# Patient Record
Sex: MALE | Race: BLACK OR AFRICAN AMERICAN | NOT HISPANIC OR LATINO | URBAN - METROPOLITAN AREA
[De-identification: names, ages, dates, MRNs, and addresses within clinical notes are randomized per-mention and may not be internally consistent; named-entity substitution may affect disease eponyms.]

---

## 2020-10-28 ENCOUNTER — INPATIENT (INPATIENT)
Facility: HOSPITAL | Age: 65
LOS: 1 days | Discharge: HOME | DRG: 315 | End: 2020-10-30
Attending: INTERNAL MEDICINE | Admitting: INTERNAL MEDICINE
Payer: OTHER GOVERNMENT

## 2020-10-28 VITALS — HEIGHT: 72 IN

## 2020-10-28 DIAGNOSIS — I21.3 ST ELEVATION (STEMI) MYOCARDIAL INFARCTION OF UNSPECIFIED SITE: ICD-10-CM

## 2020-10-28 LAB
ALBUMIN SERPL ELPH-MCNC: 3.6 G/DL — SIGNIFICANT CHANGE UP (ref 3.3–5.2)
ALP SERPL-CCNC: 86 U/L — SIGNIFICANT CHANGE UP (ref 40–120)
ALT FLD-CCNC: 8 U/L — SIGNIFICANT CHANGE UP
ANION GAP SERPL CALC-SCNC: 12 MMOL/L — SIGNIFICANT CHANGE UP (ref 5–17)
AST SERPL-CCNC: 9 U/L — SIGNIFICANT CHANGE UP
BASOPHILS # BLD AUTO: 0.05 K/UL — SIGNIFICANT CHANGE UP (ref 0–0.2)
BASOPHILS NFR BLD AUTO: 0.5 % — SIGNIFICANT CHANGE UP (ref 0–2)
BILIRUB SERPL-MCNC: 0.3 MG/DL — LOW (ref 0.4–2)
BUN SERPL-MCNC: 16 MG/DL — SIGNIFICANT CHANGE UP (ref 8–20)
CALCIUM SERPL-MCNC: 8.4 MG/DL — LOW (ref 8.6–10.2)
CHLORIDE SERPL-SCNC: 106 MMOL/L — SIGNIFICANT CHANGE UP (ref 98–107)
CO2 SERPL-SCNC: 22 MMOL/L — SIGNIFICANT CHANGE UP (ref 22–29)
CREAT SERPL-MCNC: 1.09 MG/DL — SIGNIFICANT CHANGE UP (ref 0.5–1.3)
EOSINOPHIL # BLD AUTO: 0.08 K/UL — SIGNIFICANT CHANGE UP (ref 0–0.5)
EOSINOPHIL NFR BLD AUTO: 0.9 % — SIGNIFICANT CHANGE UP (ref 0–6)
GLUCOSE BLDC GLUCOMTR-MCNC: 243 MG/DL — HIGH (ref 70–99)
GLUCOSE BLDC GLUCOMTR-MCNC: 274 MG/DL — HIGH (ref 70–99)
GLUCOSE BLDC GLUCOMTR-MCNC: 368 MG/DL — HIGH (ref 70–99)
GLUCOSE SERPL-MCNC: 304 MG/DL — HIGH (ref 70–99)
HCT VFR BLD CALC: 35.2 % — LOW (ref 39–50)
HGB BLD-MCNC: 11.9 G/DL — LOW (ref 13–17)
IMM GRANULOCYTES NFR BLD AUTO: 0.3 % — SIGNIFICANT CHANGE UP (ref 0–1.5)
LYMPHOCYTES # BLD AUTO: 1.77 K/UL — SIGNIFICANT CHANGE UP (ref 1–3.3)
LYMPHOCYTES # BLD AUTO: 19.2 % — SIGNIFICANT CHANGE UP (ref 13–44)
MAGNESIUM SERPL-MCNC: 1.7 MG/DL — SIGNIFICANT CHANGE UP (ref 1.6–2.6)
MCHC RBC-ENTMCNC: 27.5 PG — SIGNIFICANT CHANGE UP (ref 27–34)
MCHC RBC-ENTMCNC: 33.8 GM/DL — SIGNIFICANT CHANGE UP (ref 32–36)
MCV RBC AUTO: 81.5 FL — SIGNIFICANT CHANGE UP (ref 80–100)
MONOCYTES # BLD AUTO: 0.67 K/UL — SIGNIFICANT CHANGE UP (ref 0–0.9)
MONOCYTES NFR BLD AUTO: 7.3 % — SIGNIFICANT CHANGE UP (ref 2–14)
NEUTROPHILS # BLD AUTO: 6.63 K/UL — SIGNIFICANT CHANGE UP (ref 1.8–7.4)
NEUTROPHILS NFR BLD AUTO: 71.8 % — SIGNIFICANT CHANGE UP (ref 43–77)
NT-PROBNP SERPL-SCNC: 620 PG/ML — HIGH (ref 0–300)
PLATELET # BLD AUTO: 241 K/UL — SIGNIFICANT CHANGE UP (ref 150–400)
POTASSIUM SERPL-MCNC: 3.3 MMOL/L — LOW (ref 3.5–5.3)
POTASSIUM SERPL-SCNC: 3.3 MMOL/L — LOW (ref 3.5–5.3)
PROT SERPL-MCNC: 6.3 G/DL — LOW (ref 6.6–8.7)
RBC # BLD: 4.32 M/UL — SIGNIFICANT CHANGE UP (ref 4.2–5.8)
RBC # FLD: 13.6 % — SIGNIFICANT CHANGE UP (ref 10.3–14.5)
SARS-COV-2 RNA SPEC QL NAA+PROBE: SIGNIFICANT CHANGE UP
SODIUM SERPL-SCNC: 140 MMOL/L — SIGNIFICANT CHANGE UP (ref 135–145)
TROPONIN T SERPL-MCNC: <0.01 NG/ML — SIGNIFICANT CHANGE UP (ref 0–0.06)
TROPONIN T SERPL-MCNC: <0.01 NG/ML — SIGNIFICANT CHANGE UP (ref 0–0.06)
WBC # BLD: 9.23 K/UL — SIGNIFICANT CHANGE UP (ref 3.8–10.5)
WBC # FLD AUTO: 9.23 K/UL — SIGNIFICANT CHANGE UP (ref 3.8–10.5)

## 2020-10-28 PROCEDURE — 71045 X-RAY EXAM CHEST 1 VIEW: CPT | Mod: 26

## 2020-10-28 PROCEDURE — 99223 1ST HOSP IP/OBS HIGH 75: CPT | Mod: AI

## 2020-10-28 PROCEDURE — 99222 1ST HOSP IP/OBS MODERATE 55: CPT

## 2020-10-28 PROCEDURE — 99291 CRITICAL CARE FIRST HOUR: CPT

## 2020-10-28 PROCEDURE — 93010 ELECTROCARDIOGRAM REPORT: CPT

## 2020-10-28 RX ORDER — DEXTROSE 50 % IN WATER 50 %
25 SYRINGE (ML) INTRAVENOUS ONCE
Refills: 0 | Status: DISCONTINUED | OUTPATIENT
Start: 2020-10-28 | End: 2020-10-30

## 2020-10-28 RX ORDER — INSULIN LISPRO 100/ML
VIAL (ML) SUBCUTANEOUS
Refills: 0 | Status: DISCONTINUED | OUTPATIENT
Start: 2020-10-28 | End: 2020-10-30

## 2020-10-28 RX ORDER — HEPARIN SODIUM 5000 [USP'U]/ML
5000 INJECTION INTRAVENOUS; SUBCUTANEOUS EVERY 12 HOURS
Refills: 0 | Status: DISCONTINUED | OUTPATIENT
Start: 2020-10-28 | End: 2020-10-30

## 2020-10-28 RX ORDER — POTASSIUM CHLORIDE 20 MEQ
40 PACKET (EA) ORAL ONCE
Refills: 0 | Status: COMPLETED | OUTPATIENT
Start: 2020-10-28 | End: 2020-10-28

## 2020-10-28 RX ORDER — GLUCAGON INJECTION, SOLUTION 0.5 MG/.1ML
1 INJECTION, SOLUTION SUBCUTANEOUS ONCE
Refills: 0 | Status: DISCONTINUED | OUTPATIENT
Start: 2020-10-28 | End: 2020-10-30

## 2020-10-28 RX ORDER — MORPHINE SULFATE 50 MG/1
4 CAPSULE, EXTENDED RELEASE ORAL ONCE
Refills: 0 | Status: DISCONTINUED | OUTPATIENT
Start: 2020-10-28 | End: 2020-10-28

## 2020-10-28 RX ORDER — SODIUM CHLORIDE 9 MG/ML
1000 INJECTION, SOLUTION INTRAVENOUS
Refills: 0 | Status: DISCONTINUED | OUTPATIENT
Start: 2020-10-28 | End: 2020-10-30

## 2020-10-28 RX ORDER — ASPIRIN/CALCIUM CARB/MAGNESIUM 324 MG
324 TABLET ORAL ONCE
Refills: 0 | Status: COMPLETED | OUTPATIENT
Start: 2020-10-28 | End: 2020-10-28

## 2020-10-28 RX ORDER — ASPIRIN/CALCIUM CARB/MAGNESIUM 324 MG
81 TABLET ORAL DAILY
Refills: 0 | Status: DISCONTINUED | OUTPATIENT
Start: 2020-10-29 | End: 2020-10-30

## 2020-10-28 RX ORDER — DEXTROSE 50 % IN WATER 50 %
12.5 SYRINGE (ML) INTRAVENOUS ONCE
Refills: 0 | Status: DISCONTINUED | OUTPATIENT
Start: 2020-10-28 | End: 2020-10-30

## 2020-10-28 RX ORDER — DEXTROSE 50 % IN WATER 50 %
15 SYRINGE (ML) INTRAVENOUS ONCE
Refills: 0 | Status: DISCONTINUED | OUTPATIENT
Start: 2020-10-28 | End: 2020-10-30

## 2020-10-28 RX ADMIN — MORPHINE SULFATE 4 MILLIGRAM(S): 50 CAPSULE, EXTENDED RELEASE ORAL at 11:33

## 2020-10-28 RX ADMIN — MORPHINE SULFATE 4 MILLIGRAM(S): 50 CAPSULE, EXTENDED RELEASE ORAL at 12:22

## 2020-10-28 RX ADMIN — HEPARIN SODIUM 5000 UNIT(S): 5000 INJECTION INTRAVENOUS; SUBCUTANEOUS at 16:51

## 2020-10-28 RX ADMIN — Medication 324 MILLIGRAM(S): at 11:33

## 2020-10-28 RX ADMIN — Medication 40 MILLIEQUIVALENT(S): at 13:22

## 2020-10-28 RX ADMIN — Medication 10: at 16:51

## 2020-10-28 NOTE — ED PROVIDER NOTE - PMH
Anxiety Attack    Arrhythmia  s/p ?ablation 4 years ago @ [facility]  BPH (Benign Prostatic Hypertrophy)  took pills for 1 month then stopped 3 months ago b/c "they weren't helping"  Diabetes    DJD (Degenerative Joint Disease)  of BL Knees & R shoulder  Herniated Disc  C4-6, & 2 bulging discs in lower back  HTN (Hypertension)    Prostate CA

## 2020-10-28 NOTE — CONSULT NOTE ADULT - ASSESSMENT
64 M, former smoker, with a significant PMH of "cardiac fat removal years ago", obesity and glaucoma who presents to the ER following a near syncopal, hypotensive episode at his out patient opthalmology office and was BIBEMS for hypotension.  Code STEMI called.  Upon evaluation, patient without chest pain or shortness of breath. Repeat EKG with LVH.    Near syncope  - Code STEMI cancelled  - Patient asymptomatic  - Pending CBC, CMP, Mg, Phos, Trop, BNP and COVID PCR  - Patient to receive  mg  - Serial troponins  - Repeat EKG in setting of chest pain and call cardiology  - Pending TTE for evaluation of wall motion abnormalities  - [place] cardiology following.   64 M, former smoker, with a significant PMH of "cardiac fat removal years ago", obesity and glaucoma who presents to the ER following a near syncopal, hypotensive episode at his out patient opthalmology office and was BIBEMS for hypotension.  Code STEMI called.  Upon evaluation, patient without chest pain or shortness of breath. Repeat EKG with LVH.    Near syncope  - Code STEMI cancelled; EKG shows LVH with repolarization abnormality  - Patient asymptomatic  - labs reviewed, first troponin is negative  - received  mg  - Serial troponin  - Pending TTE for evaluation of wall motion abnormalities, LV structure, and function  - check orthostatic vitals; hydrate with NS IVF 100cc/hr x1L  - tele x 24 hours  - trial of meclizine for vertigo  - follow up with ophthamology for eye pain

## 2020-10-28 NOTE — ED ADULT NURSE NOTE - PMH
Anxiety Attack    Arrhythmia  s/p ?ablation 4 years ago @ [facility]  Borderline Diabetes Mellitus    BPH (Benign Prostatic Hypertrophy)  took pills for 1 month then stopped 3 months ago b/c "they weren't helping"  DJD (Degenerative Joint Disease)  of BL Knees & R shoulder  Herniated Disc  C4-6, & 2 bulging discs in lower back  HTN (Hypertension)     Anxiety Attack    Arrhythmia  s/p ?ablation 4 years ago @ [facility]  BPH (Benign Prostatic Hypertrophy)  took pills for 1 month then stopped 3 months ago b/c "they weren't helping"  Diabetes    DJD (Degenerative Joint Disease)  of BL Knees & R shoulder  Herniated Disc  C4-6, & 2 bulging discs in lower back  HTN (Hypertension)    Prostate CA

## 2020-10-28 NOTE — ED PROVIDER NOTE - CLINICAL SUMMARY MEDICAL DECISION MAKING FREE TEXT BOX
CODE STEMI from EKG life alert, BP/hr improved with fluids, repeat EKG still with abnormalities, Dr. Prieto at [facility], likely LVH unless change in ekg/trop or CP/SOB no cath now. serial trops, TTE. possible vasovagal related to eye manipulation, chornic eye pain and HA.

## 2020-10-28 NOTE — H&P ADULT - NSHPLABSRESULTS_GEN_ALL_CORE
11.9   9.23  )-----------( 241      ( 28 Oct 2020 11:28 )             35.2       10-28    140  |  106  |  16.0  ----------------------------<  304<H>  3.3<L>   |  22.0  |  1.09    Ca    8.4<L>      28 Oct 2020 11:28  Mg     1.7     10-28    TPro  6.3<L>  /  Alb  3.6  /  TBili  0.3<L>  /  DBili  x   /  AST  9   /  ALT  8   /  AlkPhos  86  10-28      < from: Xray Chest 1 View- PORTABLE-Urgent (10.28.20 @ 11:41) >    INTERPRETATION:  AP chest on October 28, 2020 at 11:33 AM. Patient has chest pain.    Extreme left lateral chest is cut off the study.    COMPARISON: None available.    Heart is magnified by technique.    No lung consolidations or layering effusions are evident.    IMPRESSION: No acute finding.        MEME TAYLOR MD; Attending Radiologist  This document has been electronically signed. Oct 28 2020  2:23PM    < end of copied text >

## 2020-10-28 NOTE — ED ADULT TRIAGE NOTE - CHIEF COMPLAINT QUOTE
pt BIBA from home for chest pain. as per ems, pt hypotensive and bradycardic en route. CODE STEMI activated upon arrival. pt sent to [facility].

## 2020-10-28 NOTE — H&P ADULT - NSICDXPASTMEDICALHX_GEN_ALL_CORE_FT
PAST MEDICAL HISTORY:  Anxiety Attack     Arrhythmia s/p ?ablation 4 years ago @ [facility]    BPH (Benign Prostatic Hypertrophy) took pills for 1 month then stopped 3 months ago b/c "they weren't helping"    Diabetes     DJD (Degenerative Joint Disease) of BL Knees & R shoulder    Herniated Disc C4-6, & 2 bulging discs in lower back    HTN (Hypertension)     Prostate CA

## 2020-10-28 NOTE — ED PROVIDER NOTE - PHYSICAL EXAMINATION
Gen: uncomfortable, laying in stretcher with eyes closed, AOx3  Head: NCAT  HEENT: unable to exam Lt eye due to pain and inability to fully open eylid, rt eye wnl, oral mucosa moist, normal conjunctiva, neck supple  Lung: CTAB, no respiratory distress  CV: bradycardic, no murmur, Normal perfusion  Abd: soft, NTND, obese  MSK: trace b/l LE edema, no visible deformities  Neuro: No focal neurologic deficits  Skin: No rash   Psych: normal affect

## 2020-10-28 NOTE — ED ADULT NURSE NOTE - OBJECTIVE STATEMENT
Pt BIBA comes from opthomologist office s/p receiving injection dye for upcoming glaucoma surgery, as per EMS, pt had syncopal episode, became hypotensive w/ abnormal EKG, c/o chest pain which has subsided since ED arrival, hx of heart disease, HTN, HLD, DM, and had fatty tissue removed from heart ~ 20 years ago, AOx3, resp even and unlabored, IVF infusing by EMS, pt received no medications PTA, code STEMI activated, quit smoking ~ 20 years ago

## 2020-10-28 NOTE — ED ADULT NURSE REASSESSMENT NOTE - NS ED NURSE REASSESS COMMENT FT1
Assumed patient care at 1210, report received from previous RN, charting as noted. Patient A&Ox4 complaining of pain to left eye, has patch in place. Denies any chest pain, shortness of breath, nausea or dizziness. Respirations even & unlabored. Placed on cardiac monitor. Saline lock in place, patent, negative s/s phlebitis or infiltration. Provided with urinal as requested. Plan of care discussed, all questions answered. Will monitor.

## 2020-10-28 NOTE — H&P ADULT - NSHPPHYSICALEXAM_GEN_ALL_CORE
PHYSICAL EXAM:  Vital Signs Last 24 Hrs  T(C): 36.6 (28 Oct 2020 11:36), Max: 36.6 (28 Oct 2020 11:36)  T(F): 97.8 (28 Oct 2020 11:36), Max: 97.8 (28 Oct 2020 11:36)  HR: 68 (28 Oct 2020 11:58) (68 - 71)  BP: 125/65 (28 Oct 2020 11:58) (120/59 - 174/72)  BP(mean): --  RR: 20 (28 Oct 2020 11:58) (18 - 20)  SpO2: 94% (28 Oct 2020 11:58) (94% - 94%)    GENERAL: NAD, well-groomed, well-developed  HEAD:  Atraumatic, Normocephalic  EYES: Righ EOM & PERRLA, conjunctiva and sclera clear, Left eye covered with gauze, right eye no abnormalities.  ENMT: No tonsillar erythema, exudates, or enlargement; Moist mucous membranes  NERVOUS SYSTEM:  Alert & Oriented X3, Good concentration; Motor Strength 5/5 B/L upper and lower extremities  CHEST/LUNG: Clear to auscultation bilaterally; No rales, rhonchi, wheezing  HEART: Regular rate and rhythm; +2 holosystolic murmur  ABDOMEN: Soft, Nontender, Nondistended; Bowel sounds present  EXTREMITIES:  2+ Peripheral Pulses, No clubbing, cyanosis, or edema

## 2020-10-28 NOTE — H&P ADULT - HISTORY OF PRESENT ILLNESS
64 year old male with pmhx of HTN, DM, COPD, glaucoma and prostate ca s/p cyber knife procedure presents to the ED after a near syncopal episode at his ophthalmologist office. Patient stated that he was sitting down after a procedure on his eye. He said he felt like he "blacked out" but was able to hear everything going on. He was found to be hypotensive. He denies any prodromal symptoms including flashing lights, ringing in the hear, seizure activity, dizziness, chest pain or worsening SOB. He does admit that he was in pain in his eye right before the episode. EKG taken in the ambulance showed St elevation. Code STEMI was called, repeat EKG showed LVH with repolarization per cardio eval. Patient has been asymptomatic, first trop was negative and code STEMI was canceled. He received morphine and ASA 325mg. He is currently comfortable, denies any pain, CP, SOB or dizziness.

## 2020-10-28 NOTE — ED PROVIDER NOTE - OBJECTIVE STATEMENT
63yo M with glaucoma and cataracts- chronic lt eye pain and unable to see for 5 weeks, was at optho today for retinal exam, had pressure checked and flourescin stain had synopal epsidoes, EMS arrived BP in 60systolic, HR 50s, given fluids improved. no CP/SOB. main complaint now of HA lt sided chronic related to eye had before episode. no focal weakness. +dizziness now improved since initial EMS arrval, has h/o DM, HTN and 'fat around my heart' they 'went in my groin and sucked it out' no stents. has a 'thick heart' and abnormal ekg.

## 2020-10-28 NOTE — ED PROVIDER NOTE - CARE PLAN
Principal Discharge DX:	Syncope and collapse  Secondary Diagnosis:	Glaucoma  Secondary Diagnosis:	Abnormal EKG

## 2020-10-28 NOTE — H&P ADULT - ASSESSMENT
64 year old male with pmhx of HTN, DM, COPD, glaucoma and prostate ca s/p cyber knife procedure presents to the ED after a near syncopal episode at his ophthalmologist office. Patient stated that he was sitting down after a procedure on his eye. He said he felt like he "blacked out" but was able to hear everything going on. He was found to be hypotensive. He denies any prodromal symptoms including flashing lights, ringing in the hear, seizure activity, dizziness, chest pain or worsening SOB. He does admit that he was in pain in his eye right before the episode. EKG taken in the ambulance showed St elevation. Code STEMI was called, repeat EKG showed LVH with repolarization per cardio eval. Patient has been asymptomatic, first trop was negative and code STEMI was canceled. He received morphine and ASA 325mg. He is currently comfortable, denies any pain, CP, SOB or dizziness.    1) Near syncope- likely vasovagal, EKG abnormalities.   Admit to tele  Cardiology consult: EKG shows LVH with repolarization, unlikely ACS. Observe on telemetry overnight, echocardiogram tomorrow.   Continue to trend troponin.   Continue ASA 81mg daily  Unsure if patient is on statin, obtain lipid panel   Obtain orthostatic BP   Continue meclozine for symptomatic management    2) Hypokalemia  K 3.3 on admission   40mEq given in the ED  Will continue to monitor     3) HTN, DM  Home medications unknown by patient, [facility] since [facility] and no one picks up.   Start ISS while in the hospital   BP stable at this time at 125/65, consider lisinopril if elevated.     4) COPD   94% on RA at this time, not in respiratory distress, lung clear to auscultation  monitor    5) Prophylactic measure  - DVT ppx: heparin SQ      Home medications unknown by patient, unable [facility] since I called and no one picks up.

## 2020-10-29 LAB
A1C WITH ESTIMATED AVERAGE GLUCOSE RESULT: 11.5 % — HIGH (ref 4–5.6)
ANION GAP SERPL CALC-SCNC: 13 MMOL/L — SIGNIFICANT CHANGE UP (ref 5–17)
BUN SERPL-MCNC: 15 MG/DL — SIGNIFICANT CHANGE UP (ref 8–20)
CALCIUM SERPL-MCNC: 8.9 MG/DL — SIGNIFICANT CHANGE UP (ref 8.6–10.2)
CHLORIDE SERPL-SCNC: 106 MMOL/L — SIGNIFICANT CHANGE UP (ref 98–107)
CHOLEST SERPL-MCNC: 181 MG/DL — SIGNIFICANT CHANGE UP
CO2 SERPL-SCNC: 24 MMOL/L — SIGNIFICANT CHANGE UP (ref 22–29)
CREAT SERPL-MCNC: 0.95 MG/DL — SIGNIFICANT CHANGE UP (ref 0.5–1.3)
ESTIMATED AVERAGE GLUCOSE: 283 MG/DL — HIGH (ref 68–114)
GLUCOSE BLDC GLUCOMTR-MCNC: 160 MG/DL — HIGH (ref 70–99)
GLUCOSE BLDC GLUCOMTR-MCNC: 166 MG/DL — HIGH (ref 70–99)
GLUCOSE BLDC GLUCOMTR-MCNC: 177 MG/DL — HIGH (ref 70–99)
GLUCOSE BLDC GLUCOMTR-MCNC: 203 MG/DL — HIGH (ref 70–99)
GLUCOSE BLDC GLUCOMTR-MCNC: 221 MG/DL — HIGH (ref 70–99)
GLUCOSE SERPL-MCNC: 167 MG/DL — HIGH (ref 70–99)
HCT VFR BLD CALC: 39 % — SIGNIFICANT CHANGE UP (ref 39–50)
HCV AB S/CO SERPL IA: 0.09 S/CO — SIGNIFICANT CHANGE UP (ref 0–0.99)
HCV AB SERPL-IMP: SIGNIFICANT CHANGE UP
HDLC SERPL-MCNC: 38 MG/DL — LOW
HGB BLD-MCNC: 13 G/DL — SIGNIFICANT CHANGE UP (ref 13–17)
LIPID PNL WITH DIRECT LDL SERPL: 116 MG/DL — HIGH
MAGNESIUM SERPL-MCNC: 1.7 MG/DL — SIGNIFICANT CHANGE UP (ref 1.6–2.6)
MCHC RBC-ENTMCNC: 27.7 PG — SIGNIFICANT CHANGE UP (ref 27–34)
MCHC RBC-ENTMCNC: 33.3 GM/DL — SIGNIFICANT CHANGE UP (ref 32–36)
MCV RBC AUTO: 83 FL — SIGNIFICANT CHANGE UP (ref 80–100)
NON HDL CHOLESTEROL: 143 MG/DL — HIGH
PLATELET # BLD AUTO: 263 K/UL — SIGNIFICANT CHANGE UP (ref 150–400)
POTASSIUM SERPL-MCNC: 3.8 MMOL/L — SIGNIFICANT CHANGE UP (ref 3.5–5.3)
POTASSIUM SERPL-SCNC: 3.8 MMOL/L — SIGNIFICANT CHANGE UP (ref 3.5–5.3)
RBC # BLD: 4.7 M/UL — SIGNIFICANT CHANGE UP (ref 4.2–5.8)
RBC # FLD: 13.6 % — SIGNIFICANT CHANGE UP (ref 10.3–14.5)
SARS-COV-2 IGG SERPL QL IA: NEGATIVE — SIGNIFICANT CHANGE UP
SARS-COV-2 IGM SERPL IA-ACNC: 0.02 INDEX — SIGNIFICANT CHANGE UP
SODIUM SERPL-SCNC: 143 MMOL/L — SIGNIFICANT CHANGE UP (ref 135–145)
TRIGL SERPL-MCNC: 134 MG/DL — SIGNIFICANT CHANGE UP
WBC # BLD: 9.27 K/UL — SIGNIFICANT CHANGE UP (ref 3.8–10.5)
WBC # FLD AUTO: 9.27 K/UL — SIGNIFICANT CHANGE UP (ref 3.8–10.5)

## 2020-10-29 PROCEDURE — 99223 1ST HOSP IP/OBS HIGH 75: CPT | Mod: 57

## 2020-10-29 PROCEDURE — 93306 TTE W/DOPPLER COMPLETE: CPT | Mod: 26

## 2020-10-29 PROCEDURE — 99232 SBSQ HOSP IP/OBS MODERATE 35: CPT

## 2020-10-29 RX ORDER — CYCLOPENTOLATE HYDROCHLORIDE 10 MG/ML
1 SOLUTION/ DROPS OPHTHALMIC
Refills: 0 | Status: DISCONTINUED | OUTPATIENT
Start: 2020-10-29 | End: 2020-10-30

## 2020-10-29 RX ORDER — FLUTICASONE PROPIONATE 50 MCG
1 SPRAY, SUSPENSION NASAL
Refills: 0 | Status: DISCONTINUED | OUTPATIENT
Start: 2020-10-29 | End: 2020-10-30

## 2020-10-29 RX ORDER — METFORMIN HYDROCHLORIDE 850 MG/1
1 TABLET ORAL
Qty: 0 | Refills: 0 | DISCHARGE

## 2020-10-29 RX ORDER — TAMSULOSIN HYDROCHLORIDE 0.4 MG/1
0.4 CAPSULE ORAL AT BEDTIME
Refills: 0 | Status: DISCONTINUED | OUTPATIENT
Start: 2020-10-29 | End: 2020-10-30

## 2020-10-29 RX ORDER — INFLUENZA VIRUS VACCINE 15; 15; 15; 15 UG/.5ML; UG/.5ML; UG/.5ML; UG/.5ML
0.5 SUSPENSION INTRAMUSCULAR ONCE
Refills: 0 | Status: DISCONTINUED | OUTPATIENT
Start: 2020-10-29 | End: 2020-10-30

## 2020-10-29 RX ORDER — CARVEDILOL PHOSPHATE 80 MG/1
25 CAPSULE, EXTENDED RELEASE ORAL ONCE
Refills: 0 | Status: COMPLETED | OUTPATIENT
Start: 2020-10-29 | End: 2020-10-29

## 2020-10-29 RX ORDER — ALFUZOSIN HYDROCHLORIDE 10 MG/1
1 TABLET, EXTENDED RELEASE ORAL
Qty: 0 | Refills: 0 | DISCHARGE

## 2020-10-29 RX ORDER — ATORVASTATIN CALCIUM 80 MG/1
40 TABLET, FILM COATED ORAL AT BEDTIME
Refills: 0 | Status: DISCONTINUED | OUTPATIENT
Start: 2020-10-29 | End: 2020-10-30

## 2020-10-29 RX ORDER — LABETALOL HCL 100 MG
400 TABLET ORAL ONCE
Refills: 0 | Status: COMPLETED | OUTPATIENT
Start: 2020-10-29 | End: 2020-10-29

## 2020-10-29 RX ORDER — INSULIN GLARGINE 100 [IU]/ML
30 INJECTION, SOLUTION SUBCUTANEOUS AT BEDTIME
Refills: 0 | Status: DISCONTINUED | OUTPATIENT
Start: 2020-10-29 | End: 2020-10-30

## 2020-10-29 RX ORDER — LISINOPRIL 2.5 MG/1
40 TABLET ORAL DAILY
Refills: 0 | Status: DISCONTINUED | OUTPATIENT
Start: 2020-10-29 | End: 2020-10-30

## 2020-10-29 RX ORDER — HYDRALAZINE HCL 50 MG
10 TABLET ORAL ONCE
Refills: 0 | Status: COMPLETED | OUTPATIENT
Start: 2020-10-29 | End: 2020-10-29

## 2020-10-29 RX ORDER — CARVEDILOL PHOSPHATE 80 MG/1
25 CAPSULE, EXTENDED RELEASE ORAL EVERY 12 HOURS
Refills: 0 | Status: DISCONTINUED | OUTPATIENT
Start: 2020-10-29 | End: 2020-10-29

## 2020-10-29 RX ORDER — HYDRALAZINE HCL 50 MG
5 TABLET ORAL ONCE
Refills: 0 | Status: COMPLETED | OUTPATIENT
Start: 2020-10-29 | End: 2020-10-29

## 2020-10-29 RX ORDER — ACETAMINOPHEN 500 MG
650 TABLET ORAL ONCE
Refills: 0 | Status: COMPLETED | OUTPATIENT
Start: 2020-10-29 | End: 2020-10-29

## 2020-10-29 RX ORDER — LABETALOL HCL 100 MG
10 TABLET ORAL ONCE
Refills: 0 | Status: COMPLETED | OUTPATIENT
Start: 2020-10-29 | End: 2020-10-29

## 2020-10-29 RX ORDER — LISINOPRIL 2.5 MG/1
40 TABLET ORAL DAILY
Refills: 0 | Status: DISCONTINUED | OUTPATIENT
Start: 2020-10-29 | End: 2020-10-29

## 2020-10-29 RX ORDER — ACETAMINOPHEN 500 MG
650 TABLET ORAL EVERY 6 HOURS
Refills: 0 | Status: DISCONTINUED | OUTPATIENT
Start: 2020-10-29 | End: 2020-10-30

## 2020-10-29 RX ORDER — LISINOPRIL 2.5 MG/1
40 TABLET ORAL
Refills: 0 | Status: DISCONTINUED | OUTPATIENT
Start: 2020-10-29 | End: 2020-10-29

## 2020-10-29 RX ORDER — ACETAZOLAMIDE 250 MG/1
250 TABLET ORAL DAILY
Refills: 0 | Status: DISCONTINUED | OUTPATIENT
Start: 2020-10-29 | End: 2020-10-29

## 2020-10-29 RX ORDER — FUROSEMIDE 40 MG
40 TABLET ORAL DAILY
Refills: 0 | Status: DISCONTINUED | OUTPATIENT
Start: 2020-10-29 | End: 2020-10-29

## 2020-10-29 RX ORDER — FUROSEMIDE 40 MG
40 TABLET ORAL
Refills: 0 | Status: DISCONTINUED | OUTPATIENT
Start: 2020-10-29 | End: 2020-10-30

## 2020-10-29 RX ORDER — TAMSULOSIN HYDROCHLORIDE 0.4 MG/1
0.8 CAPSULE ORAL AT BEDTIME
Refills: 0 | Status: DISCONTINUED | OUTPATIENT
Start: 2020-10-29 | End: 2020-10-29

## 2020-10-29 RX ORDER — CLOTRIMAZOLE 10 MG
2 TROCHE MUCOUS MEMBRANE
Qty: 0 | Refills: 0 | DISCHARGE

## 2020-10-29 RX ORDER — ASCORBIC ACID 60 MG
500 TABLET,CHEWABLE ORAL DAILY
Refills: 0 | Status: DISCONTINUED | OUTPATIENT
Start: 2020-10-29 | End: 2020-10-30

## 2020-10-29 RX ORDER — INSULIN LISPRO 100/ML
3 VIAL (ML) SUBCUTANEOUS
Refills: 0 | Status: DISCONTINUED | OUTPATIENT
Start: 2020-10-29 | End: 2020-10-30

## 2020-10-29 RX ORDER — ACETAZOLAMIDE 250 MG/1
250 TABLET ORAL
Refills: 0 | Status: DISCONTINUED | OUTPATIENT
Start: 2020-10-29 | End: 2020-10-29

## 2020-10-29 RX ORDER — AMLODIPINE BESYLATE 2.5 MG/1
10 TABLET ORAL DAILY
Refills: 0 | Status: DISCONTINUED | OUTPATIENT
Start: 2020-10-29 | End: 2020-10-30

## 2020-10-29 RX ORDER — CYCLOPENTOLATE HYDROCHLORIDE 10 MG/ML
1 SOLUTION/ DROPS OPHTHALMIC
Qty: 0 | Refills: 0 | DISCHARGE

## 2020-10-29 RX ORDER — LATANOPROST 0.05 MG/ML
1 SOLUTION/ DROPS OPHTHALMIC; TOPICAL AT BEDTIME
Refills: 0 | Status: DISCONTINUED | OUTPATIENT
Start: 2020-10-29 | End: 2020-10-30

## 2020-10-29 RX ORDER — ACETAZOLAMIDE 250 MG/1
1 TABLET ORAL
Qty: 0 | Refills: 0 | DISCHARGE

## 2020-10-29 RX ORDER — AMLODIPINE BESYLATE 2.5 MG/1
10 TABLET ORAL DAILY
Refills: 0 | Status: DISCONTINUED | OUTPATIENT
Start: 2020-10-29 | End: 2020-10-29

## 2020-10-29 RX ORDER — OXYCODONE HYDROCHLORIDE 5 MG/1
5 TABLET ORAL EVERY 6 HOURS
Refills: 0 | Status: DISCONTINUED | OUTPATIENT
Start: 2020-10-29 | End: 2020-10-30

## 2020-10-29 RX ADMIN — Medication 10 MILLIGRAM(S): at 18:16

## 2020-10-29 RX ADMIN — CYCLOPENTOLATE HYDROCHLORIDE 1 DROP(S): 10 SOLUTION/ DROPS OPHTHALMIC at 18:16

## 2020-10-29 RX ADMIN — Medication 2: at 07:29

## 2020-10-29 RX ADMIN — Medication 650 MILLIGRAM(S): at 02:48

## 2020-10-29 RX ADMIN — Medication 650 MILLIGRAM(S): at 01:48

## 2020-10-29 RX ADMIN — Medication 1 TABLET(S): at 11:55

## 2020-10-29 RX ADMIN — Medication 5 MILLIGRAM(S): at 01:48

## 2020-10-29 RX ADMIN — ATORVASTATIN CALCIUM 40 MILLIGRAM(S): 80 TABLET, FILM COATED ORAL at 21:16

## 2020-10-29 RX ADMIN — CARVEDILOL PHOSPHATE 25 MILLIGRAM(S): 80 CAPSULE, EXTENDED RELEASE ORAL at 10:36

## 2020-10-29 RX ADMIN — HEPARIN SODIUM 5000 UNIT(S): 5000 INJECTION INTRAVENOUS; SUBCUTANEOUS at 05:16

## 2020-10-29 RX ADMIN — Medication 3 UNIT(S): at 07:29

## 2020-10-29 RX ADMIN — Medication 2: at 16:51

## 2020-10-29 RX ADMIN — LATANOPROST 1 DROP(S): 0.05 SOLUTION/ DROPS OPHTHALMIC; TOPICAL at 22:27

## 2020-10-29 RX ADMIN — AMLODIPINE BESYLATE 10 MILLIGRAM(S): 2.5 TABLET ORAL at 11:25

## 2020-10-29 RX ADMIN — INSULIN GLARGINE 30 UNIT(S): 100 INJECTION, SOLUTION SUBCUTANEOUS at 21:16

## 2020-10-29 RX ADMIN — HEPARIN SODIUM 5000 UNIT(S): 5000 INJECTION INTRAVENOUS; SUBCUTANEOUS at 18:16

## 2020-10-29 RX ADMIN — Medication 40 MILLIGRAM(S): at 18:15

## 2020-10-29 RX ADMIN — Medication 40 MILLIGRAM(S): at 10:36

## 2020-10-29 RX ADMIN — Medication 3 UNIT(S): at 11:21

## 2020-10-29 RX ADMIN — Medication 650 MILLIGRAM(S): at 20:20

## 2020-10-29 RX ADMIN — Medication 500 MILLIGRAM(S): at 11:55

## 2020-10-29 RX ADMIN — LISINOPRIL 40 MILLIGRAM(S): 2.5 TABLET ORAL at 10:36

## 2020-10-29 RX ADMIN — Medication 81 MILLIGRAM(S): at 11:25

## 2020-10-29 RX ADMIN — TAMSULOSIN HYDROCHLORIDE 0.4 MILLIGRAM(S): 0.4 CAPSULE ORAL at 21:16

## 2020-10-29 RX ADMIN — Medication 400 MILLIGRAM(S): at 20:21

## 2020-10-29 RX ADMIN — Medication 650 MILLIGRAM(S): at 09:05

## 2020-10-29 RX ADMIN — Medication 4: at 11:21

## 2020-10-29 RX ADMIN — Medication 10 MILLIGRAM(S): at 08:14

## 2020-10-29 RX ADMIN — Medication 650 MILLIGRAM(S): at 21:20

## 2020-10-29 RX ADMIN — Medication 3 UNIT(S): at 16:51

## 2020-10-29 RX ADMIN — Medication 650 MILLIGRAM(S): at 08:14

## 2020-10-29 NOTE — PROGRESS NOTE ADULT - SUBJECTIVE AND OBJECTIVE BOX
[facility]                                                       [facility]                                                               Office: [address] [address]                                                              Telephone: [phone]. Fax:[phone]                                                                             PROGRESS NOTE  Reason for follow up: near syncope and EKG changes  Overnight: No new events.   Update: 10/29: Pt denies chest pain, palpitations, SOB. Tele- NSR HR @ 90-80s with T wave inversions. Echo-EF 70-75%, No AV stenosis, mild mitral annular calcification, severe LVH, small pericardial effusion localized near right ventricle.    Subjective:  No new events    	  Vitals:  T(C): 36.8 (10-29-20 @ 15:16), Max: 36.8 (10-29-20 @ 10:17)  HR: 74 (10-29-20 @ 15:16) (74 - 85)  BP: 177/91 (10-29-20 @ 15:16) (136/86 - 193/80)  RR: 20 (10-29-20 @ 15:16) (17 - 20)  SpO2: 95% (10-29-20 @ 15:16) (95% - 99%)    I&O's Summary          PHYSICAL EXAM:  Appearance: Comfortable. No acute distress  HEENT:  Head and neck: Atraumatic. Normocephalic.  Normal oral mucosa, PERRL, Neck is supple. No JVD, No carotid bruit.   Neurologic: A & O x 3, no focal deficits. EOMI.  Lymphatic: No cervical lymphadenopathy  Cardiovascular: Normal S1 S2, +sys. murmur grade III/VI, 2nd intercostal space, No JVD, No edema   Respiratory: Lungs clear to auscultation  Gastrointestinal:  Soft, Non-tender, + BS  Lower Extremities: Trace edema  Psychiatry: Patient is calm. No agitation. Mood & affect appropriate  Skin: No rashes/ ecchymoses/cyanosis/ulcers visualized on the face, hands or feet       CURRENT MEDICATIONS:  amLODIPine   Tablet 10 milliGRAM(s) Oral daily  carvedilol 25 milliGRAM(s) Oral every 12 hours  furosemide    Tablet 40 milliGRAM(s) Oral two times a day  lisinopril 40 milliGRAM(s) Oral daily  tamsulosin 0.4 milliGRAM(s) Oral at bedtime  atorvastatin  dextrose 50% Injectable  dextrose 50% Injectable  dextrose 50% Injectable  insulin glargine Injectable (LANTUS)  insulin lispro (ADMELOG) corrective regimen sliding scale  insulin lispro Injectable (ADMELOG)  ascorbic acid  aspirin enteric coated  cyclopentolate 1% Solution  dextrose 5%.  heparin   Injectable  latanoprost 0.005% Ophthalmic Solution  multivitamin      DIAGNOSTIC TESTING:  [ ] Echocardiogram: < from: TTE Echo Complete w/o Contrast w/ Doppler (10.29.20 @ 15:43) >  Summary:   1. Technically adequate study.   2. Hyperdynamic global left ventricular systolic function.   3. Left ventricular ejection fraction, by visual estimation, is 70 to 75%.   4. Severely increased LV wall thickness.   5. There is severe asymmetric left ventricular hypertrophy.   6. Global longitudinal left ventricle strain is abnormal, average GLS -12%.   7. Normal right ventricle size and systolic function.   8. Normal left atrial size.   9. Normal right atrial size.  10. Mild mitral annular calcification.  11. Mild thickening and calcification of the anterior and posterior mitral valve leaflets.  12. Trace mitral valve regurgitation.  13. No aortic valve stenosis.  14. Small pericardial effusion localized near the right ventricle.  15. Recommend clinical correlation with the above findings.        LABS:	 	  CARDIAC MARKERS ( 28 Oct 2020 17:09 )  x     / <0.01 ng/mL / x     / x     / x      p-BNP 28 Oct 2020 17:09: x    , CARDIAC MARKERS ( 28 Oct 2020 11:28 )  x     / <0.01 ng/mL / x     / x     / x      p-BNP 28 Oct 2020 11:28: 620 pg/mL                          13.0   9.27  )-----------( 263      ( 29 Oct 2020 03:17 )             39.0     10-29    143  |  106  |  15.0  ----------------------------<  167<H>  3.8   |  24.0  |  0.95    Ca    8.9      29 Oct 2020 03:17  Mg     1.7     10-29    TPro  6.3<L>  /  Alb  3.6  /  TBili  0.3<L>  /  DBili  x   /  AST  9   /  ALT  8   /  AlkPhos  86  10-28    proBNP: Serum Pro-Brain Natriuretic Peptide: 620 pg/mL (10-28 @ 11:28)    Lipid Profile: Date: 10-29 @ 03:17  Total cholesterol 181; Direct LDL: --; HDL: 38; Triglycerides:134        TELEMETRY:  NSR HR @ 90-80s, with T wave inversions

## 2020-10-29 NOTE — PROGRESS NOTE ADULT - ASSESSMENT
64 year old male with pmhx of HTN, DM, COPD, glaucoma and prostate ca s/p cyber knife procedure presents to the ED after a near syncopal episode at his ophthalmologist office. Patient stated that he was sitting down after a procedure on his eye. He said he felt like he "blacked out" but was able to hear everything going on. He was found to be hypotensive. He denies any prodromal symptoms including flashing lights, ringing in the hear, seizure activity, dizziness, chest pain or worsening SOB. He does admit that he was in pain in his eye right before the episode. EKG taken in the ambulance showed St elevation. Code STEMI was called, repeat EKG showed LVH with repolarization per cardio eval. Patient has been asymptomatic, first trop was negative and code STEMI was canceled. He received morphine and ASA 325mg. He is currently comfortable, denies any pain, CP, SOB or dizziness.    >Near syncope- likely vasovagal, EKG abnormalities:   -Cardiology consult: EKG shows LVH with repolarization, unlikely ACS. Observe on telemetry overnight,   - f/u echo   - trend troponin.   -Continue ASA 81mg daily  -orthostatic vs     > Hypokalemia K 3.3 on admission   - monitor and trend     >HTN, DM  - [facility]     > COPD / not in acute exacerbations   -94% on RA at this time, not in respiratory distress, lung clear to auscultation  monitor    >Prophylactic measure  - DVT ppx: heparin SQ

## 2020-10-29 NOTE — PROGRESS NOTE ADULT - ASSESSMENT
64 M, former smoker, with a significant PMH of "cardiac fat removal years ago", obesity and glaucoma who presents to the ER following a near syncopal, hypotensive episode at his out patient opthalmology office and was BIBEMS for hypotension.  Code STEMI called.  Upon evaluation, patient without chest pain or shortness of breath. Repeat EKG with LVH.    10/29: Pt denies chest pain, palpitations, SOB. Tele- NSR HR @ 90-80s with T wave inversions. Echo-EF 70-75%, No AV stenosis, mild mitral annular calcification, severe LVH, small pericardial effusion localized near right ventricle. Recent BP- 177/91, Give labetalol 400mg in place of night coreg dose  Give labetalol 10mg IVP now. Possible plan for cardiac MRI d/t results found on echo    Near syncope  - Code STEMI cancelled; EKG shows LVH with repolarization abnormality  -Patient Asymptomatic  -Tropx2-negative  - Echo- EF 70-75%, No AV stenosis, mild mitral annular calcification, severe assymmetric LVH, small pericardial effusion localized near right ventricle.   - check orthostatic vitals  - Tele monitoring  - follow up with ophthamology for eye pain      HTN  -Recent BP- 177/91  -Pt s/p hydralazine IVP 15mg in total  -DC Coreg  -Give labetalol 400mg in place of night coreg dose  -Give labetalol 10mg IVP now    64 M, former smoker, with a significant PMH of "cardiac fat removal years ago", obesity and glaucoma who presents to the ER following a near syncopal, hypotensive episode at his out patient opthalmology office and was BIBEMS for hypotension.  Code STEMI called.  Upon evaluation, patient without chest pain or shortness of breath. Repeat EKG with LVH.    10/29: Pt denies chest pain, palpitations, SOB. Tele- NSR HR @ 90-80s with T wave inversions. Echo-EF 70-75%, No AV stenosis, mild mitral annular calcification, severe LVH, small pericardial effusion localized near right ventricle. Recent BP- 177/91, Give labetalol 400mg in place of night coreg dose  Give labetalol 10mg IVP now. Possible plan for cardiac MRI d/t results found on echo    Near syncope  - Code STEMI cancelled; EKG shows LVH with repolarization abnormality  - no additional symptoms  -Tropx2-negative  - Echo- EF 70-75%, No AV stenosis, mild mitral annular calcification, severe asymmetric LVH, small pericardial effusion localized near right ventricle.   - concern for HCM given near syncope and severe LVH phenotype  - cardiac MRI with gadolinium to further evaluate; EP c/s for possible ILR pending result  - check orthostatic vitals  - Tele monitoring  - follow up with ophthamology for eye pain      HTN  -Recent BP- 177/91  -Pt s/p hydralazine IVP 15mg in total  -DC Coreg  -Give labetalol 400mg in place of night coreg dose  -Give labetalol 10mg IVP now   - continue amlodipine and lisinopril  -avoid dehydration given severe LVH with hyperdynamic LVEF

## 2020-10-29 NOTE — PROGRESS NOTE ADULT - SUBJECTIVE AND OBJECTIVE BOX
cc: syncope     interval hx : patient seen and eval. comfortable. no fever or chills. no n/v/abd pain. felt little dizzy in am but now feels better.     Vital Signs Last 24 Hrs  T(C): 36.8 (29 Oct 2020 15:16), Max: 36.8 (29 Oct 2020 10:17)  T(F): 98.3 (29 Oct 2020 15:16), Max: 98.3 (29 Oct 2020 10:17)  HR: 74 (29 Oct 2020 15:16) (74 - 85)  BP: 177/91 (29 Oct 2020 15:16) (136/86 - 193/80)  BP(mean): --  RR: 20 (29 Oct 2020 15:16) (17 - 20)  SpO2: 95% (29 Oct 2020 15:16) (95% - 99%)    GENERAL: NAD, well-groomed, well-developed  HEAD:  Atraumatic, Normocephalic  EYES: Right  EOM & PERRLA, conjunctiva and sclera clear, Left eye covered with gauze, right eye no abnormalities.  ENMT: No tonsillar erythema, exudates, or enlargement; Moist mucous membranes  NERVOUS SYSTEM:  Alert & Oriented X3, Good concentration; Motor Strength 5/5 B/L upper and lower extremities  CHEST/LUNG: Clear to auscultation bilaterally; No rales, rhonchi, wheezing  HEART: Regular rate and rhythm; +2 holosystolic murmur  ABDOMEN: Soft, Nontender, Nondistended; Bowel sounds present  EXTREMITIES:  2+ Peripheral Pulses, No clubbing, cyanosis, or edema                          13.0   9.27  )-----------( 263      ( 29 Oct 2020 03:17 )             39.0   10-29    143  |  106  |  15.0  ----------------------------<  167<H>  3.8   |  24.0  |  0.95    Ca    8.9      29 Oct 2020 03:17  Mg     1.7     10-29    TPro  6.3<L>  /  Alb  3.6  /  TBili  0.3<L>  /  DBili  x   /  AST  9   /  ALT  8   /  AlkPhos  86  10-28

## 2020-10-30 ENCOUNTER — TRANSCRIPTION ENCOUNTER (OUTPATIENT)
Age: 65
End: 2020-10-30

## 2020-10-30 VITALS
DIASTOLIC BLOOD PRESSURE: 83 MMHG | OXYGEN SATURATION: 92 % | SYSTOLIC BLOOD PRESSURE: 154 MMHG | RESPIRATION RATE: 18 BRPM | HEART RATE: 89 BPM | TEMPERATURE: 98 F

## 2020-10-30 LAB
ANION GAP SERPL CALC-SCNC: 13 MMOL/L — SIGNIFICANT CHANGE UP (ref 5–17)
BUN SERPL-MCNC: 13 MG/DL — SIGNIFICANT CHANGE UP (ref 8–20)
CALCIUM SERPL-MCNC: 8.8 MG/DL — SIGNIFICANT CHANGE UP (ref 8.6–10.2)
CHLORIDE SERPL-SCNC: 101 MMOL/L — SIGNIFICANT CHANGE UP (ref 98–107)
CO2 SERPL-SCNC: 26 MMOL/L — SIGNIFICANT CHANGE UP (ref 22–29)
CREAT SERPL-MCNC: 0.86 MG/DL — SIGNIFICANT CHANGE UP (ref 0.5–1.3)
GLUCOSE BLDC GLUCOMTR-MCNC: 163 MG/DL — HIGH (ref 70–99)
GLUCOSE BLDC GLUCOMTR-MCNC: 166 MG/DL — HIGH (ref 70–99)
GLUCOSE SERPL-MCNC: 171 MG/DL — HIGH (ref 70–99)
HCT VFR BLD CALC: 39 % — SIGNIFICANT CHANGE UP (ref 39–50)
HGB BLD-MCNC: 13.3 G/DL — SIGNIFICANT CHANGE UP (ref 13–17)
MAGNESIUM SERPL-MCNC: 1.8 MG/DL — SIGNIFICANT CHANGE UP (ref 1.8–2.6)
MCHC RBC-ENTMCNC: 27.8 PG — SIGNIFICANT CHANGE UP (ref 27–34)
MCHC RBC-ENTMCNC: 34.1 GM/DL — SIGNIFICANT CHANGE UP (ref 32–36)
MCV RBC AUTO: 81.4 FL — SIGNIFICANT CHANGE UP (ref 80–100)
PLATELET # BLD AUTO: 288 K/UL — SIGNIFICANT CHANGE UP (ref 150–400)
POTASSIUM SERPL-MCNC: 3.3 MMOL/L — LOW (ref 3.5–5.3)
POTASSIUM SERPL-SCNC: 3.3 MMOL/L — LOW (ref 3.5–5.3)
RBC # BLD: 4.79 M/UL — SIGNIFICANT CHANGE UP (ref 4.2–5.8)
RBC # FLD: 13.7 % — SIGNIFICANT CHANGE UP (ref 10.3–14.5)
SODIUM SERPL-SCNC: 140 MMOL/L — SIGNIFICANT CHANGE UP (ref 135–145)
WBC # BLD: 8.62 K/UL — SIGNIFICANT CHANGE UP (ref 3.8–10.5)
WBC # FLD AUTO: 8.62 K/UL — SIGNIFICANT CHANGE UP (ref 3.8–10.5)

## 2020-10-30 PROCEDURE — 85025 COMPLETE CBC W/AUTO DIFF WBC: CPT

## 2020-10-30 PROCEDURE — 84484 ASSAY OF TROPONIN QUANT: CPT

## 2020-10-30 PROCEDURE — 86803 HEPATITIS C AB TEST: CPT

## 2020-10-30 PROCEDURE — 80048 BASIC METABOLIC PNL TOTAL CA: CPT

## 2020-10-30 PROCEDURE — 80053 COMPREHEN METABOLIC PANEL: CPT

## 2020-10-30 PROCEDURE — 99285 EMERGENCY DEPT VISIT HI MDM: CPT

## 2020-10-30 PROCEDURE — 83735 ASSAY OF MAGNESIUM: CPT

## 2020-10-30 PROCEDURE — 87635 SARS-COV-2 COVID-19 AMP PRB: CPT

## 2020-10-30 PROCEDURE — 83036 HEMOGLOBIN GLYCOSYLATED A1C: CPT

## 2020-10-30 PROCEDURE — 83880 ASSAY OF NATRIURETIC PEPTIDE: CPT

## 2020-10-30 PROCEDURE — 71045 X-RAY EXAM CHEST 1 VIEW: CPT

## 2020-10-30 PROCEDURE — 80061 LIPID PANEL: CPT

## 2020-10-30 PROCEDURE — 86769 SARS-COV-2 COVID-19 ANTIBODY: CPT

## 2020-10-30 PROCEDURE — 99239 HOSP IP/OBS DSCHRG MGMT >30: CPT

## 2020-10-30 PROCEDURE — 93306 TTE W/DOPPLER COMPLETE: CPT

## 2020-10-30 PROCEDURE — 93005 ELECTROCARDIOGRAM TRACING: CPT

## 2020-10-30 PROCEDURE — 82962 GLUCOSE BLOOD TEST: CPT

## 2020-10-30 PROCEDURE — 85027 COMPLETE CBC AUTOMATED: CPT

## 2020-10-30 PROCEDURE — 36415 COLL VENOUS BLD VENIPUNCTURE: CPT

## 2020-10-30 RX ORDER — ENOXAPARIN SODIUM 100 MG/ML
40 INJECTION SUBCUTANEOUS
Qty: 0 | Refills: 0 | DISCHARGE

## 2020-10-30 RX ORDER — LABETALOL HCL 100 MG
2 TABLET ORAL
Qty: 120 | Refills: 0
Start: 2020-10-30 | End: 2020-11-28

## 2020-10-30 RX ORDER — AMLODIPINE BESYLATE 2.5 MG/1
1 TABLET ORAL
Qty: 30 | Refills: 0
Start: 2020-10-30 | End: 2020-11-28

## 2020-10-30 RX ORDER — METFORMIN HYDROCHLORIDE 850 MG/1
2 TABLET ORAL
Qty: 0 | Refills: 0 | DISCHARGE

## 2020-10-30 RX ORDER — CLOTRIMAZOLE 10 MG
1 TROCHE MUCOUS MEMBRANE
Qty: 0 | Refills: 0 | DISCHARGE

## 2020-10-30 RX ORDER — POTASSIUM CHLORIDE 20 MEQ
20 PACKET (EA) ORAL
Refills: 0 | Status: COMPLETED | OUTPATIENT
Start: 2020-10-30 | End: 2020-10-30

## 2020-10-30 RX ORDER — ASPIRIN/CALCIUM CARB/MAGNESIUM 324 MG
1 TABLET ORAL
Qty: 0 | Refills: 0 | DISCHARGE
Start: 2020-10-30

## 2020-10-30 RX ORDER — ATORVASTATIN CALCIUM 80 MG/1
0.5 TABLET, FILM COATED ORAL
Qty: 0 | Refills: 0 | DISCHARGE

## 2020-10-30 RX ORDER — CYCLOPENTOLATE HYDROCHLORIDE 10 MG/ML
1 SOLUTION/ DROPS OPHTHALMIC
Qty: 0 | Refills: 0 | DISCHARGE

## 2020-10-30 RX ORDER — CARVEDILOL PHOSPHATE 80 MG/1
1 CAPSULE, EXTENDED RELEASE ORAL
Qty: 0 | Refills: 0 | DISCHARGE

## 2020-10-30 RX ORDER — TAMSULOSIN HYDROCHLORIDE 0.4 MG/1
1 CAPSULE ORAL
Qty: 0 | Refills: 0 | DISCHARGE

## 2020-10-30 RX ORDER — LISINOPRIL 2.5 MG/1
1 TABLET ORAL
Qty: 0 | Refills: 0 | DISCHARGE

## 2020-10-30 RX ORDER — LABETALOL HCL 100 MG
400 TABLET ORAL EVERY 12 HOURS
Refills: 0 | Status: DISCONTINUED | OUTPATIENT
Start: 2020-10-30 | End: 2020-10-30

## 2020-10-30 RX ORDER — ACETAMINOPHEN 500 MG
2 TABLET ORAL
Qty: 0 | Refills: 0 | DISCHARGE
Start: 2020-10-30

## 2020-10-30 RX ORDER — LATANOPROST 0.05 MG/ML
1 SOLUTION/ DROPS OPHTHALMIC; TOPICAL
Qty: 0 | Refills: 0 | DISCHARGE

## 2020-10-30 RX ORDER — FLUTICASONE PROPIONATE 50 MCG
1 SPRAY, SUSPENSION NASAL
Qty: 0 | Refills: 0 | DISCHARGE

## 2020-10-30 RX ORDER — FUROSEMIDE 40 MG
1 TABLET ORAL
Qty: 0 | Refills: 0 | DISCHARGE

## 2020-10-30 RX ORDER — ASCORBIC ACID 60 MG
1 TABLET,CHEWABLE ORAL
Qty: 0 | Refills: 0 | DISCHARGE

## 2020-10-30 RX ORDER — PREGABALIN 225 MG/1
1 CAPSULE ORAL
Qty: 0 | Refills: 0 | DISCHARGE

## 2020-10-30 RX ADMIN — Medication 650 MILLIGRAM(S): at 06:14

## 2020-10-30 RX ADMIN — AMLODIPINE BESYLATE 10 MILLIGRAM(S): 2.5 TABLET ORAL at 05:11

## 2020-10-30 RX ADMIN — Medication 2: at 08:39

## 2020-10-30 RX ADMIN — Medication 500 MILLIGRAM(S): at 08:39

## 2020-10-30 RX ADMIN — LISINOPRIL 40 MILLIGRAM(S): 2.5 TABLET ORAL at 05:11

## 2020-10-30 RX ADMIN — Medication 81 MILLIGRAM(S): at 08:39

## 2020-10-30 RX ADMIN — Medication 650 MILLIGRAM(S): at 05:14

## 2020-10-30 RX ADMIN — CYCLOPENTOLATE HYDROCHLORIDE 1 DROP(S): 10 SOLUTION/ DROPS OPHTHALMIC at 05:19

## 2020-10-30 RX ADMIN — OXYCODONE HYDROCHLORIDE 5 MILLIGRAM(S): 5 TABLET ORAL at 08:43

## 2020-10-30 RX ADMIN — Medication 40 MILLIGRAM(S): at 05:11

## 2020-10-30 RX ADMIN — HEPARIN SODIUM 5000 UNIT(S): 5000 INJECTION INTRAVENOUS; SUBCUTANEOUS at 10:45

## 2020-10-30 RX ADMIN — OXYCODONE HYDROCHLORIDE 5 MILLIGRAM(S): 5 TABLET ORAL at 00:15

## 2020-10-30 RX ADMIN — OXYCODONE HYDROCHLORIDE 5 MILLIGRAM(S): 5 TABLET ORAL at 14:54

## 2020-10-30 RX ADMIN — Medication 20 MILLIEQUIVALENT(S): at 10:45

## 2020-10-30 RX ADMIN — Medication 3 UNIT(S): at 12:13

## 2020-10-30 RX ADMIN — Medication 20 MILLIEQUIVALENT(S): at 12:13

## 2020-10-30 RX ADMIN — OXYCODONE HYDROCHLORIDE 5 MILLIGRAM(S): 5 TABLET ORAL at 01:15

## 2020-10-30 RX ADMIN — Medication 1 TABLET(S): at 08:39

## 2020-10-30 RX ADMIN — OXYCODONE HYDROCHLORIDE 5 MILLIGRAM(S): 5 TABLET ORAL at 09:43

## 2020-10-30 RX ADMIN — Medication 3 UNIT(S): at 08:39

## 2020-10-30 RX ADMIN — Medication 2: at 12:13

## 2020-10-30 NOTE — DISCHARGE NOTE PROVIDER - NSDCMRMEDTOKEN_GEN_ALL_CORE_FT
acetaminophen 325 mg oral tablet: 2 tab(s) orally every 6 hours, As needed, Mild Pain (1 - 3)  amLODIPine 10 mg oral tablet: 1 tab(s) orally once a day  ascorbic acid 500 mg oral tablet: 1 tab(s) orally once a day  aspirin 81 mg oral delayed release tablet: 1 tab(s) orally once a day  atorvastatin 80 mg oral tablet: 0.5 tab(s) orally once a day (at bedtime)  clotrimazole 1% topical cream (obsolete): Apply topically to affected area 2 times a day  cyanocobalamin 100 mcg oral tablet: 1 tab(s) orally once a day  cyclopentolate 1% ophthalmic solution: 1 drop(s) to each affected eye 2 times a day .  to the left eye  fluticasone 50 mcg/inh nasal spray: 1 spray(s) nasal 2 times a day, As Needed  labetalol 200 mg oral tablet: 2 tab(s) orally every 12 hours  Lantus Solostar Pen 100 units/mL subcutaneous solution: 40 unit(s) subcutaneous once a day (at bedtime)  latanoprost 0.005% preservative-free ophthalmic solution: 1 application to each affected eye once a day (at bedtime), As Needed  lisinopril 40 mg oral tablet: 1 tab(s) orally once a day  metFORMIN 500 mg oral tablet: 2 tab(s) orally 2 times a day  Multiple Vitamins oral tablet: 1 tab(s) orally once a day  tamsulosin 0.4 mg oral capsule: 1 cap(s) orally once a day (at bedtime)

## 2020-10-30 NOTE — DISCHARGE NOTE PROVIDER - HOSPITAL COURSE
64 year old male with pmhx of HTN, DM, COPD, glaucoma and prostate ca s/p cyber knife procedure presents to the ED after a near syncopal episode at his ophthalmologist office. Patient stated that he was sitting down after a procedure on his eye. He said he felt like he "blacked out" but was able to hear everything going on. He was found to be hypotensive. He denies any prodromal symptoms including flashing lights, ringing in the hear, seizure activity, dizziness, chest pain or worsening SOB. He does admit that he was in pain in his eye right before the episode. EKG taken in the ambulance showed St elevation. Code STEMI was called, repeat EKG showed LVH with repolarization per cardio eval. Patient has been asymptomatic, first trop was negative and code STEMI was canceled. He received morphine and ASA 325mg. He is currently comfortable, denies any pain, CP, SOB or dizziness.  Near syncope EKG shows LVH with repolarization abnormality. no additional symptoms  Tropx2-negative. Echo- EF 70-75%, No AV stenosis, mild mitral annular calcification, severe asymmetric LVH, small pericardial effusion localized near right ventricle. concern for HCM given near syncope and severe LVH phenotype.  cardiac MRI with gadolinium to further evaluate; patient is asymptomatic and has had no events on telemetry x 48 hours.  Cardiac MRI can be performed as an outpatient. MCT as outpatient for arrhythmia. avoid dehydration; discontinue diuretics.  BP improved compared to PM yesterday. DC Coreg, start labetalol 400 mg q12h. continue amlodipine and lisinopril  -avoid dehydration given severe LVH with hyperdynamic LVEF.   discussed with cardiology np and dr. rincon. patient cleared for dc home and follow up op.   patient to follow up with ophthamology  patient hemodynamically stable. dc plan discussed with patient and on his request with his daughter kyle [phone] . updated of plan of care as above.     Vital Signs Last 24 Hrs  T(C): 36.6 (30 Oct 2020 10:04), Max: 36.9 (30 Oct 2020 05:07)  T(F): 97.9 (30 Oct 2020 10:04), Max: 98.5 (30 Oct 2020 05:07)  HR: 83 (30 Oct 2020 10:04) (71 - 85)  BP: 132/82 (30 Oct 2020 10:31) (132/82 - 184/99)  BP(mean): --  RR: 18 (30 Oct 2020 10:04) (16 - 20)  SpO2: 98% (30 Oct 2020 10:04) (95% - 99%)    GENERAL: NAD  NECK: Supple, No JVD  NERVOUS SYSTEM:  Alert & Oriented X3, non focal neuro exam.   CHEST/LUNG: clear lungs, No rales, rhonchi, wheezing, or rubs  HEART: Regular rate and rhythm; s1 and s2 auscultated, 2/6 HSM  ABDOMEN: Soft, Nontender, Nondistended; Bowel sounds present and normoactive.   EXTREMITIES:  2+ Peripheral Pulses, No clubbing, cyanosis, or edema

## 2020-10-30 NOTE — PROGRESS NOTE ADULT - ASSESSMENT
[facility]                          [address] [place] [address]                       Phone: [phone]. Fax:[phone]                      ________________________________________________  HPI:  64 year old male with pmhx of HTN, DM, COPD, glaucoma and prostate ca s/p cyber knife procedure presents to the ED after a near syncopal episode at his ophthalmologist office. Patient stated that he was sitting down after a procedure on his eye. He said he felt like he "blacked out" but was able to hear everything going on. He was found to be hypotensive. He denies any prodromal symptoms including flashing lights, ringing in the hear, seizure activity, dizziness, chest pain or worsening SOB. He does admit that he was in pain in his eye right before the episode. EKG taken in the ambulance showed St elevation. Code STEMI was called, repeat EKG showed LVH with repolarization per cardio eval. Patient has been asymptomatic, first trop was negative and code STEMI was canceled. He received morphine and ASA 325mg. He is currently comfortable, denies any pain, CP, SOB or dizziness. (28 Oct 2020 14:24)    ROS: All review of systems negative unless indicated otherwise below.                          LAB RESULTS                   COMPLETE BLOOD COUNT( 30 Oct 2020 06:03 )                            13.3 g/dL  8.62 K/uL )---------------( 288 K/uL                        39.0 %      Automated Differential     Auto Basophil # - X      Auto Basophil % - X      Auto Eosinophil # - X      Auto Eosinophil % - X      Auto Immature Granulocyte # - X      Auto Immature Granulocyte % - X      Auto Lymphocyte # - X      Auto Lymphocyte % - X      Auto Monocyte # - X      Auto Monocyte % - X      Auto Neutrophil # - X      Auto Neutrophil % - X                                      CHEMISTRY                 Basic Metabolic Panel (10-30-20 @ 06:03)    140  |  101  |  13.0  ----------------------------<  171<H>  3.3<L>   |  26.0  |  0.86    Ca    8.8      30 Oct 2020 06:03  Mg     1.8     10-30                    Liver Functions (10-28-20 @ 11:28))  TPro  6.3  /  Alb  3.6  /  TBili  0.3  /  DBili  x   /  AST  9   /  ALT  8   /  AlkPhos  86                               Cardiac Enzymes   ( 28 Oct 2020 17:09 )  Troponin T  <0.01,  CPK  X    , CKMB  X    , BNP X        , ( 28 Oct 2020 11:28 )  Troponin T  <0.01,  CPK  X    , CKMB  X    , <H>                          RADIOLOGY RESULTS: Personally visualized     < from: Xray Chest 1 View- PORTABLE-Urgent (10.28.20 @ 11:41) >  IMPRESSION: No acute finding.    < end of copied text >                          CARDIOLOGY RESULTS: Official Report/Preliminary Verbal Reports    ECHO:     < from: TTE Echo Complete w/o Contrast w/ Doppler (10.29.20 @ 15:43) >  Summary:   1. Technically adequate study.   2. Hyperdynamic global left ventricular systolic function.   3. Left ventricular ejection fraction, by visual estimation, is 70 to 75%.   4. Severely increased LV wall thickness.   5. There is severe asymmetric left ventricular hypertrophy.   6. Global longitudinal left ventricle strain is abnormal, average GLS -12%.   7. Normal right ventricle size and systolic function.   8. Normal left atrial size.   9. Normal right atrial size.  10. Mild mitral annular calcification.  11. Mild thickening and calcification of the anterior and posterior mitral valve leaflets.  12. Trace mitral valve regurgitation.  13. No aortic valve stenosis.  14. Small pericardial effusion localized near the right ventricle.  15. Recommend clinical correlation with the above findings.    Isaac Louis MD Electronically signed on 10/29/2020 at 4:56:05 PM    < end of copied text >                          CARDIOLOGY REVIEW: Personally visualized and reviewed  Telemetry Last 24h: SR 90s                              DAILY WEIGHTS - 48 HOUR TREND     Daily Weight in k.8 (30 Oct 2020 05:51)                             INTAKE AND OUTPUT - 48 HOUR TREND     10-29-20 @ 07:01  -  10-30-20 @ 07:00  --------------------------------------------------------  IN:  Total IN: 0 mL    OUT:    Voided (mL): 900 mL  Total OUT: 900 mL    Total NET: -900 mL    HOME MEDICATIONS:  ascorbic acid 500 mg oral tablet: 1 tab(s) orally once a day (29 Oct 2020 10:32)  atorvastatin 80 mg oral tablet: 0.5 tab(s) orally once a day (at bedtime) (29 Oct 2020 11:39)  carvedilol 25 mg oral tablet: 1 tab(s) orally 2 times a day (29 Oct 2020 10:32)  clotrimazole 1% topical cream (obsolete): Apply topically to affected area 2 times a day (29 Oct 2020 11:34)  clotrimazole 1% topical solution (obsolete): Apply topically to affected area once a day, As Needed (29 Oct 2020 11:35)  cyanocobalamin 100 mcg oral tablet: 1 tab(s) orally once a day (29 Oct 2020 10:32)  cyclopentolate 1% ophthalmic solution: 1 drop(s) to each affected eye 2 times a day .  to the left eye (29 Oct 2020 10:32)  fluticasone 50 mcg/inh nasal spray: 1 spray(s) nasal 2 times a day, As Needed (29 Oct 2020 11:38)  Lantus Solostar Pen 100 units/mL subcutaneous solution: 40 unit(s) subcutaneous once a day (at bedtime) (29 Oct 2020 11:25)  Lasix 40 mg oral tablet: 1 tab(s) orally 2 times a day (29 Oct 2020 10:32)  latanoprost 0.005% preservative-free ophthalmic solution: 1 application to each affected eye once a day (at bedtime), As Needed (29 Oct 2020 11:27)  lisinopril 40 mg oral tablet: 1 tab(s) orally once a day (29 Oct 2020 10:32)  metFORMIN 500 mg oral tablet: 2 tab(s) orally 2 times a day (29 Oct 2020 11:25)  Multiple Vitamins oral tablet: 1 tab(s) orally once a day (29 Oct 2020 10:32)  tamsulosin 0.4 mg oral capsule: 1 cap(s) orally once a day (at bedtime) (29 Oct 2020 11:24)                             Current Admission Active Medications  acetaminophen   Tablet .. 650 milliGRAM(s) Oral every 6 hours PRN Mild Pain (1 - 3)  amLODIPine   Tablet 10 milliGRAM(s) Oral daily  ascorbic acid 500 milliGRAM(s) Oral daily  aspirin enteric coated 81 milliGRAM(s) Oral daily  atorvastatin 40 milliGRAM(s) Oral at bedtime  cyclopentolate 1% Solution 1 Drop(s) Left EYE two times a day  dextrose 40% Gel 15 Gram(s) Oral once PRN Blood Glucose LESS THAN 70 milliGRAM(s)/deciliter  dextrose 5%. 1000 milliLiter(s) (50 mL/Hr) IV Continuous <Continuous>  dextrose 50% Injectable 12.5 Gram(s) IV Push once  dextrose 50% Injectable 25 Gram(s) IV Push once  dextrose 50% Injectable 25 Gram(s) IV Push once  fluticasone propionate 50 MICROgram(s)/spray Nasal Spray 1 Spray(s) Both Nostrils two times a day PRN nasal congestion  glucagon  Injectable 1 milliGRAM(s) IntraMuscular once PRN Glucose LESS THAN 70 milligrams/deciliter  heparin   Injectable 5000 Unit(s) SubCutaneous every 12 hours  influenza   Vaccine 0.5 milliLiter(s) IntraMuscular once  insulin glargine Injectable (LANTUS) 30 Unit(s) SubCutaneous at bedtime  insulin lispro (ADMELOG) corrective regimen sliding scale   SubCutaneous three times a day before meals  insulin lispro Injectable (ADMELOG) 3 Unit(s) SubCutaneous three times a day before meals  labetalol 400 milliGRAM(s) Oral every 12 hours  latanoprost 0.005% Ophthalmic Solution 1 Drop(s) Left EYE at bedtime  lisinopril 40 milliGRAM(s) Oral daily  multivitamin 1 Tablet(s) Oral daily  oxyCODONE    IR 5 milliGRAM(s) Oral every 6 hours PRN Severe Pain (7 - 10)  tamsulosin 0.4 milliGRAM(s) Oral at bedtime                        PHYSICAL EXAM:  Vital Signs Last 24 Hrs  T(C): 36.6 (30 Oct 2020 10:04), Max: 36.9 (30 Oct 2020 05:07)  T(F): 97.9 (30 Oct 2020 10:04), Max: 98.5 (30 Oct 2020 05:07)  HR: 83 (30 Oct 2020 10:04) (71 - 85)  BP: 132/82 (30 Oct 2020 10:31) (132/82 - 184/99)  BP(mean): --  RR: 18 (30 Oct 2020 10:04) (16 - 20)  SpO2: 98% (30 Oct 2020 10:04) (95% - 99%)    GENERAL: NAD  NECK: Supple, No JVD  NERVOUS SYSTEM:  Alert & Oriented X3, non focal neuro exam.   CHEST/LUNG: clear lungs, No rales, rhonchi, wheezing, or rubs  HEART: Regular rate and rhythm; s1 and s2 auscultated, III/VI Murmur detected   ABDOMEN: Soft, Nontender, Nondistended; Bowel sounds present and normoactive.   EXTREMITIES:  2+ Peripheral Pulses, No clubbing, cyanosis, or edema  CATH SITE:      Near syncope  - EKG shows LVH with repolarization abnormality  - no additional symptoms  -Tropx2-negative  - Echo- EF 70-75%, No AV stenosis, mild mitral annular calcification, severe asymmetric LVH, small pericardial effusion localized near right ventricle.   - concern for HCM given near syncope and severe LVH phenotype  - cardiac MRI with gadolinium to further evaluate; patient is asymptomatic and has had no events on telemetry x 48 hours.  Cardiac MRI can be performed as an outpatient  - [facility] as outpatient for arrhythmia  - avoid dehydration; discontinue diuretics  - follow up with ophthamology for eye pain      HTN  -BP improved compared to PM yesterday  -DC Coreg, start labetalol 400 mg q12h  - continue amlodipine and lisinopril  -avoid dehydration given severe LVH with hyperdynamic LVEF

## 2020-10-30 NOTE — PROGRESS NOTE ADULT - SUBJECTIVE AND OBJECTIVE BOX
[place] [facility]                          [address] [place] [address]                       Phone: [phone]. Fax:[phone]                      _______________________________________________    HPI:  64 year old male with pmhx of HTN, DM, COPD, glaucoma and prostate ca s/p cyber knife procedure presents to the ED after a near syncopal episode at his ophthalmologist office. Patient stated that he was sitting down after a procedure on his eye. He said he felt like he "blacked out" but was able to hear everything going on. He was found to be hypotensive. He denies any prodromal symptoms including flashing lights, ringing in the hear, seizure activity, dizziness, chest pain or worsening SOB. He does admit that he was in pain in his eye right before the episode. EKG taken in the ambulance showed St elevation. Code STEMI was called, repeat EKG showed LVH with repolarization per cardio eval. Patient has been asymptomatic, first trop was negative and code STEMI was canceled. He received morphine and ASA 325mg. He is currently comfortable, denies any pain, CP, SOB or dizziness. (28 Oct 2020 14:24)    ROS: All review of systems negative unless indicated otherwise below.                          LAB RESULTS                   COMPLETE BLOOD COUNT( 30 Oct 2020 06:03 )                            13.3 g/dL  8.62 K/uL )---------------( 288 K/uL                        39.0 %      Automated Differential     Auto Basophil # - X      Auto Basophil % - X      Auto Eosinophil # - X      Auto Eosinophil % - X      Auto Immature Granulocyte # - X      Auto Immature Granulocyte % - X      Auto Lymphocyte # - X      Auto Lymphocyte % - X      Auto Monocyte # - X      Auto Monocyte % - X      Auto Neutrophil # - X      Auto Neutrophil % - X                                      CHEMISTRY                 Basic Metabolic Panel (10-30-20 @ 06:03)    140  |  101  |  13.0  ----------------------------<  171<H>  3.3<L>   |  26.0  |  0.86    Ca    8.8      30 Oct 2020 06:03  Mg     1.8     10-30                    Liver Functions (10-28-20 @ 11:28))  TPro  6.3  /  Alb  3.6  /  TBili  0.3  /  DBili  x   /  AST  9   /  ALT  8   /  AlkPhos  86                               Cardiac Enzymes   ( 28 Oct 2020 17:09 )  Troponin T  <0.01,  CPK  X    , CKMB  X    , BNP X        , ( 28 Oct 2020 11:28 )  Troponin T  <0.01,  CPK  X    , CKMB  X    , <H>                          RADIOLOGY RESULTS: Personally visualized     < from: Xray Chest 1 View- PORTABLE-Urgent (10.28.20 @ 11:41) >  IMPRESSION: No acute finding.    < end of copied text >                          CARDIOLOGY RESULTS: Official Report/Preliminary Verbal Reports    ECHO:     < from: TTE Echo Complete w/o Contrast w/ Doppler (10.29.20 @ 15:43) >  Summary:   1. Technically adequate study.   2. Hyperdynamic global left ventricular systolic function.   3. Left ventricular ejection fraction, by visual estimation, is 70 to 75%.   4. Severely increased LV wall thickness.   5. There is severe asymmetric left ventricular hypertrophy.   6. Global longitudinal left ventricle strain is abnormal, average GLS -12%.   7. Normal right ventricle size and systolic function.   8. Normal left atrial size.   9. Normal right atrial size.  10. Mild mitral annular calcification.  11. Mild thickening and calcification of the anterior and posterior mitral valve leaflets.  12. Trace mitral valve regurgitation.  13. No aortic valve stenosis.  14. Small pericardial effusion localized near the right ventricle.  15. Recommend clinical correlation with the above findings.    Isaac Louis MD Electronically signed on 10/29/2020 at 4:56:05 PM    < end of copied text >                          CARDIOLOGY REVIEW: Personally visualized and reviewed  Telemetry Last 24h: SR 80s                              DAILY WEIGHTS - 48 HOUR TREND   Daily Weight in k.8 (30 Oct 2020 05:51)                             INTAKE AND OUTPUT - 48 HOUR TREND   10-29-20 @ 07:01  -  10-30-20 @ 07:00  --------------------------------------------------------  IN:  Total IN: 0 mL    OUT:    Voided (mL): 900 mL  Total OUT: 900 mL    Total NET: -900 mL    HOME MEDICATIONS:  ascorbic acid 500 mg oral tablet: 1 tab(s) orally once a day (29 Oct 2020 10:32)  atorvastatin 80 mg oral tablet: 0.5 tab(s) orally once a day (at bedtime) (29 Oct 2020 11:39)  carvedilol 25 mg oral tablet: 1 tab(s) orally 2 times a day (29 Oct 2020 10:32)  clotrimazole 1% topical cream (obsolete): Apply topically to affected area 2 times a day (29 Oct 2020 11:34)  clotrimazole 1% topical solution (obsolete): Apply topically to affected area once a day, As Needed (29 Oct 2020 11:35)  cyanocobalamin 100 mcg oral tablet: 1 tab(s) orally once a day (29 Oct 2020 10:32)  cyclopentolate 1% ophthalmic solution: 1 drop(s) to each affected eye 2 times a day .  to the left eye (29 Oct 2020 10:32)  fluticasone 50 mcg/inh nasal spray: 1 spray(s) nasal 2 times a day, As Needed (29 Oct 2020 11:38)  Lantus Solostar Pen 100 units/mL subcutaneous solution: 40 unit(s) subcutaneous once a day (at bedtime) (29 Oct 2020 11:25)  Lasix 40 mg oral tablet: 1 tab(s) orally 2 times a day (29 Oct 2020 10:32)  latanoprost 0.005% preservative-free ophthalmic solution: 1 application to each affected eye once a day (at bedtime), As Needed (29 Oct 2020 11:27)  lisinopril 40 mg oral tablet: 1 tab(s) orally once a day (29 Oct 2020 10:32)  metFORMIN 500 mg oral tablet: 2 tab(s) orally 2 times a day (29 Oct 2020 11:25)  Multiple Vitamins oral tablet: 1 tab(s) orally once a day (29 Oct 2020 10:32)  tamsulosin 0.4 mg oral capsule: 1 cap(s) orally once a day (at bedtime) (29 Oct 2020 11:24)                             Current Admission Active Medications  acetaminophen   Tablet .. 650 milliGRAM(s) Oral every 6 hours PRN Mild Pain (1 - 3)  amLODIPine   Tablet 10 milliGRAM(s) Oral daily  ascorbic acid 500 milliGRAM(s) Oral daily  aspirin enteric coated 81 milliGRAM(s) Oral daily  atorvastatin 40 milliGRAM(s) Oral at bedtime  cyclopentolate 1% Solution 1 Drop(s) Left EYE two times a day  dextrose 40% Gel 15 Gram(s) Oral once PRN Blood Glucose LESS THAN 70 milliGRAM(s)/deciliter  dextrose 5%. 1000 milliLiter(s) (50 mL/Hr) IV Continuous <Continuous>  dextrose 50% Injectable 12.5 Gram(s) IV Push once  dextrose 50% Injectable 25 Gram(s) IV Push once  dextrose 50% Injectable 25 Gram(s) IV Push once  fluticasone propionate 50 MICROgram(s)/spray Nasal Spray 1 Spray(s) Both Nostrils two times a day PRN nasal congestion  glucagon  Injectable 1 milliGRAM(s) IntraMuscular once PRN Glucose LESS THAN 70 milligrams/deciliter  heparin   Injectable 5000 Unit(s) SubCutaneous every 12 hours  influenza   Vaccine 0.5 milliLiter(s) IntraMuscular once  insulin glargine Injectable (LANTUS) 30 Unit(s) SubCutaneous at bedtime  insulin lispro (ADMELOG) corrective regimen sliding scale   SubCutaneous three times a day before meals  insulin lispro Injectable (ADMELOG) 3 Unit(s) SubCutaneous three times a day before meals  labetalol 400 milliGRAM(s) Oral every 12 hours  latanoprost 0.005% Ophthalmic Solution 1 Drop(s) Left EYE at bedtime  lisinopril 40 milliGRAM(s) Oral daily  multivitamin 1 Tablet(s) Oral daily  oxyCODONE    IR 5 milliGRAM(s) Oral every 6 hours PRN Severe Pain (7 - 10)  tamsulosin 0.4 milliGRAM(s) Oral at bedtime                        PHYSICAL EXAM:  Vital Signs Last 24 Hrs  T(C): 36.6 (30 Oct 2020 10:04), Max: 36.9 (30 Oct 2020 05:07)  T(F): 97.9 (30 Oct 2020 10:04), Max: 98.5 (30 Oct 2020 05:07)  HR: 83 (30 Oct 2020 10:04) (71 - 85)  BP: 132/82 (30 Oct 2020 10:31) (132/82 - 184/99)  BP(mean): --  RR: 18 (30 Oct 2020 10:04) (16 - 20)  SpO2: 98% (30 Oct 2020 10:04) (95% - 99%)    GENERAL: NAD  NECK: Supple, No JVD  NERVOUS SYSTEM:  Alert & Oriented X3, non focal neuro exam.   CHEST/LUNG: clear lungs, No rales, rhonchi, wheezing, or rubs  HEART: Regular rate and rhythm; s1 and s2 auscultated, No murmurs, rubs, or gallops  ABDOMEN: Soft, Nontender, Nondistended; Bowel sounds present and normoactive.   EXTREMITIES:  2+ Peripheral Pulses, No clubbing, cyanosis, or edema       [place] [facility]                          [address] [place] [address]                       Phone: [phone]. Fax:[phone]                      _______________________________________________    HPI:  64 year old male with pmhx of HTN, DM, COPD, glaucoma and prostate ca s/p cyber knife procedure presents to the ED after a near syncopal episode at his ophthalmologist office. Patient stated that he was sitting down after a procedure on his eye. He said he felt like he "blacked out" but was able to hear everything going on. He was found to be hypotensive. He denies any prodromal symptoms including flashing lights, ringing in the hear, seizure activity, dizziness, chest pain or worsening SOB. He does admit that he was in pain in his eye right before the episode. EKG taken in the ambulance showed St elevation. Code STEMI was called, repeat EKG showed LVH with repolarization per cardio eval. Patient has been asymptomatic, first trop was negative and code STEMI was canceled. He received morphine and ASA 325mg. He is currently comfortable, denies any pain, CP, SOB or dizziness. (28 Oct 2020 14:24)    ROS: All review of systems negative unless indicated otherwise below.                          LAB RESULTS                   COMPLETE BLOOD COUNT( 30 Oct 2020 06:03 )                            13.3 g/dL  8.62 K/uL )---------------( 288 K/uL                        39.0 %      Automated Differential     Auto Basophil # - X      Auto Basophil % - X      Auto Eosinophil # - X      Auto Eosinophil % - X      Auto Immature Granulocyte # - X      Auto Immature Granulocyte % - X      Auto Lymphocyte # - X      Auto Lymphocyte % - X      Auto Monocyte # - X      Auto Monocyte % - X      Auto Neutrophil # - X      Auto Neutrophil % - X                                      CHEMISTRY                 Basic Metabolic Panel (10-30-20 @ 06:03)    140  |  101  |  13.0  ----------------------------<  171<H>  3.3<L>   |  26.0  |  0.86    Ca    8.8      30 Oct 2020 06:03  Mg     1.8     10-30                    Liver Functions (10-28-20 @ 11:28))  TPro  6.3  /  Alb  3.6  /  TBili  0.3  /  DBili  x   /  AST  9   /  ALT  8   /  AlkPhos  86                               Cardiac Enzymes   ( 28 Oct 2020 17:09 )  Troponin T  <0.01,  CPK  X    , CKMB  X    , BNP X        , ( 28 Oct 2020 11:28 )  Troponin T  <0.01,  CPK  X    , CKMB  X    , <H>                          RADIOLOGY RESULTS: Personally visualized     < from: Xray Chest 1 View- PORTABLE-Urgent (10.28.20 @ 11:41) >  IMPRESSION: No acute finding.    < end of copied text >                          CARDIOLOGY RESULTS: Official Report/Preliminary Verbal Reports    ECHO:     < from: TTE Echo Complete w/o Contrast w/ Doppler (10.29.20 @ 15:43) >  Summary:   1. Technically adequate study.   2. Hyperdynamic global left ventricular systolic function.   3. Left ventricular ejection fraction, by visual estimation, is 70 to 75%.   4. Severely increased LV wall thickness.   5. There is severe asymmetric left ventricular hypertrophy.   6. Global longitudinal left ventricle strain is abnormal, average GLS -12%.   7. Normal right ventricle size and systolic function.   8. Normal left atrial size.   9. Normal right atrial size.  10. Mild mitral annular calcification.  11. Mild thickening and calcification of the anterior and posterior mitral valve leaflets.  12. Trace mitral valve regurgitation.  13. No aortic valve stenosis.  14. Small pericardial effusion localized near the right ventricle.  15. Recommend clinical correlation with the above findings.    Isaac Louis MD Electronically signed on 10/29/2020 at 4:56:05 PM    < end of copied text >                          CARDIOLOGY REVIEW: Personally visualized and reviewed  Telemetry Last 24h: SR 80s                              DAILY WEIGHTS - 48 HOUR TREND   Daily Weight in k.8 (30 Oct 2020 05:51)                             INTAKE AND OUTPUT - 48 HOUR TREND   10-29-20 @ 07:01  -  10-30-20 @ 07:00  --------------------------------------------------------  IN:  Total IN: 0 mL    OUT:    Voided (mL): 900 mL  Total OUT: 900 mL    Total NET: -900 mL    HOME MEDICATIONS:  ascorbic acid 500 mg oral tablet: 1 tab(s) orally once a day (29 Oct 2020 10:32)  atorvastatin 80 mg oral tablet: 0.5 tab(s) orally once a day (at bedtime) (29 Oct 2020 11:39)  carvedilol 25 mg oral tablet: 1 tab(s) orally 2 times a day (29 Oct 2020 10:32)  clotrimazole 1% topical cream (obsolete): Apply topically to affected area 2 times a day (29 Oct 2020 11:34)  clotrimazole 1% topical solution (obsolete): Apply topically to affected area once a day, As Needed (29 Oct 2020 11:35)  cyanocobalamin 100 mcg oral tablet: 1 tab(s) orally once a day (29 Oct 2020 10:32)  cyclopentolate 1% ophthalmic solution: 1 drop(s) to each affected eye 2 times a day .  to the left eye (29 Oct 2020 10:32)  fluticasone 50 mcg/inh nasal spray: 1 spray(s) nasal 2 times a day, As Needed (29 Oct 2020 11:38)  Lantus Solostar Pen 100 units/mL subcutaneous solution: 40 unit(s) subcutaneous once a day (at bedtime) (29 Oct 2020 11:25)  Lasix 40 mg oral tablet: 1 tab(s) orally 2 times a day (29 Oct 2020 10:32)  latanoprost 0.005% preservative-free ophthalmic solution: 1 application to each affected eye once a day (at bedtime), As Needed (29 Oct 2020 11:27)  lisinopril 40 mg oral tablet: 1 tab(s) orally once a day (29 Oct 2020 10:32)  metFORMIN 500 mg oral tablet: 2 tab(s) orally 2 times a day (29 Oct 2020 11:25)  Multiple Vitamins oral tablet: 1 tab(s) orally once a day (29 Oct 2020 10:32)  tamsulosin 0.4 mg oral capsule: 1 cap(s) orally once a day (at bedtime) (29 Oct 2020 11:24)                             Current Admission Active Medications  acetaminophen   Tablet .. 650 milliGRAM(s) Oral every 6 hours PRN Mild Pain (1 - 3)  amLODIPine   Tablet 10 milliGRAM(s) Oral daily  ascorbic acid 500 milliGRAM(s) Oral daily  aspirin enteric coated 81 milliGRAM(s) Oral daily  atorvastatin 40 milliGRAM(s) Oral at bedtime  cyclopentolate 1% Solution 1 Drop(s) Left EYE two times a day  dextrose 40% Gel 15 Gram(s) Oral once PRN Blood Glucose LESS THAN 70 milliGRAM(s)/deciliter  dextrose 5%. 1000 milliLiter(s) (50 mL/Hr) IV Continuous <Continuous>  dextrose 50% Injectable 12.5 Gram(s) IV Push once  dextrose 50% Injectable 25 Gram(s) IV Push once  dextrose 50% Injectable 25 Gram(s) IV Push once  fluticasone propionate 50 MICROgram(s)/spray Nasal Spray 1 Spray(s) Both Nostrils two times a day PRN nasal congestion  glucagon  Injectable 1 milliGRAM(s) IntraMuscular once PRN Glucose LESS THAN 70 milligrams/deciliter  heparin   Injectable 5000 Unit(s) SubCutaneous every 12 hours  influenza   Vaccine 0.5 milliLiter(s) IntraMuscular once  insulin glargine Injectable (LANTUS) 30 Unit(s) SubCutaneous at bedtime  insulin lispro (ADMELOG) corrective regimen sliding scale   SubCutaneous three times a day before meals  insulin lispro Injectable (ADMELOG) 3 Unit(s) SubCutaneous three times a day before meals  labetalol 400 milliGRAM(s) Oral every 12 hours  latanoprost 0.005% Ophthalmic Solution 1 Drop(s) Left EYE at bedtime  lisinopril 40 milliGRAM(s) Oral daily  multivitamin 1 Tablet(s) Oral daily  oxyCODONE    IR 5 milliGRAM(s) Oral every 6 hours PRN Severe Pain (7 - 10)  tamsulosin 0.4 milliGRAM(s) Oral at bedtime                        PHYSICAL EXAM:  Vital Signs Last 24 Hrs  T(C): 36.6 (30 Oct 2020 10:04), Max: 36.9 (30 Oct 2020 05:07)  T(F): 97.9 (30 Oct 2020 10:04), Max: 98.5 (30 Oct 2020 05:07)  HR: 83 (30 Oct 2020 10:04) (71 - 85)  BP: 132/82 (30 Oct 2020 10:31) (132/82 - 184/99)  BP(mean): --  RR: 18 (30 Oct 2020 10:04) (16 - 20)  SpO2: 98% (30 Oct 2020 10:04) (95% - 99%)    GENERAL: NAD  NECK: Supple, No JVD  NERVOUS SYSTEM:  Alert & Oriented X3, non focal neuro exam.   CHEST/LUNG: clear lungs, No rales, rhonchi, wheezing, or rubs  HEART: Regular rate and rhythm; s1 and s2 auscultated, 2/6 HSM  ABDOMEN: Soft, Nontender, Nondistended; Bowel sounds present and normoactive.   EXTREMITIES:  2+ Peripheral Pulses, No clubbing, cyanosis, or edema

## 2020-10-30 NOTE — DISCHARGE NOTE PROVIDER - NSDCCPCAREPLAN_GEN_ALL_CORE_FT
PRINCIPAL DISCHARGE DIAGNOSIS  Diagnosis: Syncope and collapse  Assessment and Plan of Treatment: avoid dehydration.   stop lasix and coreg.   follow up with cardiology within 1 week to have MRI of heart done and plan for loop recorder to monitor heart rhythm.      SECONDARY DISCHARGE DIAGNOSES  Diagnosis: Glaucoma  Assessment and Plan of Treatment: continue with eye drops as per ophthalmology .   follow up with ophthalmology in as previosuly scheduled.

## 2020-10-30 NOTE — PROGRESS NOTE ADULT - NUTRITIONAL ASSESSMENT
64 M, former smoker, with a significant PMH of "cardiac fat removal years ago", obesity and glaucoma who presents to the ER following a near syncopal, hypotensive episode at his out patient opthalmology office and was BIBEMS for hypotension.  Code STEMI called.  Upon evaluation, patient without chest pain or shortness of breath. Repeat EKG with LVH.    10/29: Pt denies chest pain, palpitations, SOB. Tele- NSR HR @ 90-80s with T wave inversions. Echo-EF 70-75%, No AV stenosis, mild mitral annular calcification, severe LVH, small pericardial effusion localized near right ventricle. Recent BP- 177/91, Give labetalol 400mg in place of night coreg dose  Give labetalol 10mg IVP now. Possible plan for cardiac MRI d/t results found on echo    Near syncope  - Code STEMI cancelled; EKG shows LVH with repolarization abnormality  - no additional symptoms  -Tropx2-negative  - Echo- EF 70-75%, No AV stenosis, mild mitral annular calcification, severe asymmetric LVH, small pericardial effusion localized near right ventricle.   - concern for HCM given near syncope and severe LVH phenotype  - outpatient cardiac MRI and MCOT monitor   - stable for DC from cardiology standpoint   - follow up with [facility] within 1 week     HTN  - Recent BP- 177/91  - continue amlodipine and lisinopril  - labetalol 400mg BID   - avoid dehydration given severe LVH with hyperdynamic LVEF

## 2020-10-30 NOTE — DISCHARGE NOTE PROVIDER - PROVIDER TOKENS
PROVIDER:[TOKEN:[[id number]],FOLLOWUP:[1 week]],FREE:[LAST:[ophthalmolgy],PHONE:[(   )    -],FAX:[(   )    -],ADDRESS:[follow up as previously scheduled]]

## 2020-10-30 NOTE — DISCHARGE NOTE NURSING/CASE MANAGEMENT/SOCIAL WORK - PATIENT PORTAL LINK FT
You can access the FollowMyHealth Patient Portal offered by [facility] by registering at the following website: [email or web address]. By joining [facility]’s FollowMyHealth portal, you will also be able to view your health information using other applications (apps) compatible with our system.

## 2020-10-30 NOTE — DISCHARGE NOTE PROVIDER - CARE PROVIDER_API CALL
Cristian Pennington (DO)  Cardiovascular Disease; Internal Medicine  [address] [place] [address]  Phone: [phone]  Fax: [phone]  Follow Up Time: 1 week    ophthalmolgy,   follow up as previously scheduled  Phone: (   )    -  Fax: (   )    -  Follow Up Time:

## 2021-07-01 NOTE — PROGRESS NOTE ADULT - REASON FOR ADMISSION
near syncope and EKG changes
normal shape/ROM intact/strength intact

## 2022-09-01 ENCOUNTER — NON-APPOINTMENT (OUTPATIENT)
Age: 67
End: 2022-09-01

## 2022-09-20 NOTE — ED PROVIDER NOTE - CRTICAL CARE TIME SPENT (MIN)
Rifampin Pregnancy And Lactation Text: This medication is Pregnancy Category C and it isn't know if it is safe during pregnancy. It is also excreted in breast milk and should not be used if you are breast feeding. 30

## 2023-01-01 NOTE — CONSULT NOTE ADULT - SUBJECTIVE AND OBJECTIVE BOX
CHIEF COMPLAINT: Near Syncope    HPI:  This is a 64 M, former smoker, with a significant PMH of "cardiac fat removal years ago", obesity and glaucoma who presents to the ER following a near syncopal, hypotensive episode at his out patient opthalmology office.  EMS was called and a 12 lead yielded ST elevations in septal leads.  He denies CP or shortness of breath.  He endorses dizziness but also complains of moderate Left eye pain which may be contributory.  Repeat EKG in ER with LVH with repolarization abnormality and LA enlargement.        FAMILY HISTORY:      SOCIAL HISTORY:  Smokin/day x 20 years. Quit 2000  EtOH Use: None  Recent Travel: Denies  Advance Directives: Full code    Allergies    Advil (Unknown)  Aleve (Unknown)    Intolerances        HOME MEDICATIONS:    REVIEW OF SYSTEMS:    NEURO: +eye pain and headache. no blurry vision, tremors, depression, anxiety  CV: no chest pain, palpitations, murmurs, orthopnea  Resp: no shortness of breath, cough, wheeze, sputum production  GI: no stomach pain, nausea, vomitting, flatulence, hematemesis, hematochezia  PV: no swelling of extremities, no hair loss, no coolness to extremities  ENDO: no polydypsia, polyphagia, polyuria, weight loss, night sweats     OBJECTIVE:  ICU Vital Signs Last 24 Hrs  T(C): --  T(F): --  HR: 71 (28 Oct 2020 11:31) (71 - 71)  BP: 174/72 (28 Oct 2020 11:31) (174/72 - 174/72)  BP(mean): --  ABP: --  ABP(mean): --  RR: 18 (28 Oct 2020 11:31) (18 - 18)  SpO2: 98% (28 Oct 2020 11:31) (98% - 98%)        CAPILLARY BLOOD GLUCOSE      POCT Blood Glucose.: 274 mg/dL (28 Oct 2020 11:23)      PHYSICAL EXAM:  GENERAL: well-developed, obese  HEAD:  Atraumatic, Normocephalic  NECK: Supple, No JVD  CHEST/LUNG: Clear to auscultation bilaterally; No wheeze  HEART: Regular rate and rhythm; No murmurs, rubs, or gallops  ABDOMEN: Soft, Nontender, Obese; Bowel sounds present  EXTREMITIES:  2+ Peripheral Pulses, No clubbing, cyanosis, or edema  PSYCH: AAOx3  NEUROLOGY: non-focal  SKIN: No rashes or lesions      HOSPITAL MEDICATIONS:  MEDICATIONS  (STANDING):  aspirin  chewable 324 milliGRAM(s) Oral once  morphine  - Injectable 4 milliGRAM(s) IV Push once    MEDICATIONS  (PRN):      LABS:                    MICROBIOLOGY:     RADIOLOGY:  [ ] Reviewed and interpreted by me    EKG:  Normal sinus rhythm  Possible left atrial enlargement  Possible Left ventricular hypertrophy CHIEF COMPLAINT: Near Syncope    HPI:  This is a 64 M, former smoker, with a significant PMH of "cardiac fat removal years ago", obesity and glaucoma who presents to the ER following a near syncopal, hypotensive episode at his out patient opthalmology office.  EMS was called and a 12 lead yielded ST elevations in septal leads.  He denies CP or shortness of breath.  He endorses dizziness but also complains of moderate Left eye pain which may be contributory.  Repeat EKG in ER with LVH with repolarization abnormality and LA enlargement.        FAMILY HISTORY:      SOCIAL HISTORY:  Smokin/day x 20 years. Quit 2000  EtOH Use: None  Recent Travel: Denies  Advance Directives: Full code    Allergies    Advil (Unknown)  Aleve (Unknown)    Intolerances        HOME MEDICATIONS:    REVIEW OF SYSTEMS:    NEURO: +eye pain and headache. no blurry vision, tremors, depression, anxiety  CV: no chest pain, palpitations, murmurs, orthopnea  Resp: no shortness of breath, cough, wheeze, sputum production  GI: no stomach pain, nausea, vomitting, flatulence, hematemesis, hematochezia  PV: no swelling of extremities, no hair loss, no coolness to extremities  ENDO: no polydypsia, polyphagia, polyuria, weight loss, night sweats     OBJECTIVE:  ICU Vital Signs Last 24 Hrs  T(C): --  T(F): --  HR: 71 (28 Oct 2020 11:31) (71 - 71)  BP: 174/72 (28 Oct 2020 11:31) (174/72 - 174/72)  BP(mean): --  ABP: --  ABP(mean): --  RR: 18 (28 Oct 2020 11:31) (18 - 18)  SpO2: 98% (28 Oct 2020 11:31) (98% - 98%)        CAPILLARY BLOOD GLUCOSE      POCT Blood Glucose.: 274 mg/dL (28 Oct 2020 11:23)      PHYSICAL EXAM:  GENERAL: well-developed, obese  HEAD:  Atraumatic, Normocephalic  NECK: Supple, No JVD  CHEST/LUNG: Clear to auscultation bilaterally; No wheeze  HEART: Regular rate and rhythm; 2/6 HSM   ABDOMEN: Soft, Nontender, Obese; Bowel sounds present  EXTREMITIES:  2+ Peripheral Pulses, No clubbing, cyanosis, or edema  PSYCH: AAOx3  NEUROLOGY: non-focal  SKIN: No rashes or lesions      HOSPITAL MEDICATIONS:  MEDICATIONS  (STANDING):  aspirin  chewable 324 milliGRAM(s) Oral once  morphine  - Injectable 4 milliGRAM(s) IV Push once    MEDICATIONS  (PRN):      LABS:                    MICROBIOLOGY:     RADIOLOGY:  [ ] Reviewed and interpreted by me    EKG:  Normal sinus rhythm  Possible left atrial enlargement  Possible Left ventricular hypertrophy 46

## 2023-05-22 NOTE — ED PROVIDER NOTE - NS ED ROS FT
ROS: no CP/SOB. no cough. no fever. no n/v/d/c. no abd pain. no rash. no bleeding. no urinary complaints. no weakness. +vision changes. +HA. no neck/back pain. no extremity swelling/deformity. +change in mental status. 5 feet

## 2024-03-08 NOTE — ED ADULT NURSE NOTE - NSFALLRSKHARMRISK_ED_ALL_ED
Transitional Care Management Telephone Call Attempt    Discharge Date: 3/6/24  Discharge Location: [facility]: [facility]    Call Attempt Date: 3/8/2024  Call Attempt: Second  
no